# Patient Record
Sex: FEMALE | Race: WHITE | Employment: OTHER | URBAN - METROPOLITAN AREA
[De-identification: names, ages, dates, MRNs, and addresses within clinical notes are randomized per-mention and may not be internally consistent; named-entity substitution may affect disease eponyms.]

---

## 2017-01-25 ENCOUNTER — OFFICE VISIT (OUTPATIENT)
Dept: INTERNAL MEDICINE CLINIC | Age: 72
End: 2017-01-25

## 2017-01-25 VITALS
WEIGHT: 151 LBS | DIASTOLIC BLOOD PRESSURE: 52 MMHG | SYSTOLIC BLOOD PRESSURE: 120 MMHG | HEART RATE: 52 BPM | BODY MASS INDEX: 26.33 KG/M2

## 2017-01-25 DIAGNOSIS — R01.1 MURMUR: ICD-10-CM

## 2017-01-25 DIAGNOSIS — M17.12 PRIMARY OSTEOARTHRITIS OF LEFT KNEE: Primary | ICD-10-CM

## 2017-01-25 DIAGNOSIS — Z01.818 PRE-OP EVALUATION: ICD-10-CM

## 2017-01-25 DIAGNOSIS — I10 ESSENTIAL HYPERTENSION: ICD-10-CM

## 2017-01-25 DIAGNOSIS — E78.00 PURE HYPERCHOLESTEROLEMIA: ICD-10-CM

## 2017-01-25 PROCEDURE — 1090F PRES/ABSN URINE INCON ASSESS: CPT | Performed by: INTERNAL MEDICINE

## 2017-01-25 PROCEDURE — 3014F SCREEN MAMMO DOC REV: CPT | Performed by: INTERNAL MEDICINE

## 2017-01-25 PROCEDURE — 1123F ACP DISCUSS/DSCN MKR DOCD: CPT | Performed by: INTERNAL MEDICINE

## 2017-01-25 PROCEDURE — 93000 ELECTROCARDIOGRAM COMPLETE: CPT | Performed by: INTERNAL MEDICINE

## 2017-01-25 PROCEDURE — G8427 DOCREV CUR MEDS BY ELIG CLIN: HCPCS | Performed by: INTERNAL MEDICINE

## 2017-01-25 PROCEDURE — G8400 PT W/DXA NO RESULTS DOC: HCPCS | Performed by: INTERNAL MEDICINE

## 2017-01-25 PROCEDURE — 3017F COLORECTAL CA SCREEN DOC REV: CPT | Performed by: INTERNAL MEDICINE

## 2017-01-25 PROCEDURE — G8420 CALC BMI NORM PARAMETERS: HCPCS | Performed by: INTERNAL MEDICINE

## 2017-01-25 PROCEDURE — 99214 OFFICE O/P EST MOD 30 MIN: CPT | Performed by: INTERNAL MEDICINE

## 2017-01-25 PROCEDURE — G8484 FLU IMMUNIZE NO ADMIN: HCPCS | Performed by: INTERNAL MEDICINE

## 2017-01-25 PROCEDURE — 1036F TOBACCO NON-USER: CPT | Performed by: INTERNAL MEDICINE

## 2017-01-25 PROCEDURE — 4040F PNEUMOC VAC/ADMIN/RCVD: CPT | Performed by: INTERNAL MEDICINE

## 2017-01-25 ASSESSMENT — ENCOUNTER SYMPTOMS
SHORTNESS OF BREATH: 0
COUGH: 0

## 2017-02-21 ENCOUNTER — TELEPHONE (OUTPATIENT)
Dept: INTERNAL MEDICINE CLINIC | Age: 72
End: 2017-02-21

## 2017-08-09 ENCOUNTER — TELEPHONE (OUTPATIENT)
Dept: INTERNAL MEDICINE CLINIC | Age: 72
End: 2017-08-09

## 2017-08-11 ENCOUNTER — OFFICE VISIT (OUTPATIENT)
Dept: INTERNAL MEDICINE CLINIC | Age: 72
End: 2017-08-11

## 2017-08-11 VITALS
SYSTOLIC BLOOD PRESSURE: 139 MMHG | WEIGHT: 151 LBS | HEART RATE: 50 BPM | HEIGHT: 64 IN | OXYGEN SATURATION: 97 % | DIASTOLIC BLOOD PRESSURE: 73 MMHG | BODY MASS INDEX: 25.78 KG/M2

## 2017-08-11 DIAGNOSIS — I10 ESSENTIAL HYPERTENSION: Primary | ICD-10-CM

## 2017-08-11 DIAGNOSIS — E78.00 HYPERCHOLESTEROLEMIA: ICD-10-CM

## 2017-08-11 DIAGNOSIS — K21.9 GASTROESOPHAGEAL REFLUX DISEASE, ESOPHAGITIS PRESENCE NOT SPECIFIED: ICD-10-CM

## 2017-08-11 LAB
A/G RATIO: 1.9 (ref 1.1–2.2)
ALBUMIN SERPL-MCNC: 4.3 G/DL (ref 3.4–5)
ALP BLD-CCNC: 61 U/L (ref 40–129)
ALT SERPL-CCNC: 13 U/L (ref 10–40)
ANION GAP SERPL CALCULATED.3IONS-SCNC: 11 MMOL/L (ref 3–16)
AST SERPL-CCNC: 20 U/L (ref 15–37)
BILIRUB SERPL-MCNC: <0.2 MG/DL (ref 0–1)
BUN BLDV-MCNC: 13 MG/DL (ref 7–20)
CALCIUM SERPL-MCNC: 9.6 MG/DL (ref 8.3–10.6)
CHLORIDE BLD-SCNC: 100 MMOL/L (ref 99–110)
CHOLESTEROL, TOTAL: 222 MG/DL (ref 0–199)
CO2: 28 MMOL/L (ref 21–32)
CREAT SERPL-MCNC: 0.7 MG/DL (ref 0.6–1.2)
GFR AFRICAN AMERICAN: >60
GFR NON-AFRICAN AMERICAN: >60
GLOBULIN: 2.3 G/DL
GLUCOSE BLD-MCNC: 95 MG/DL (ref 70–99)
HDLC SERPL-MCNC: 85 MG/DL (ref 40–60)
LDL CHOLESTEROL CALCULATED: 112 MG/DL
POTASSIUM SERPL-SCNC: 4.9 MMOL/L (ref 3.5–5.1)
SODIUM BLD-SCNC: 139 MMOL/L (ref 136–145)
TOTAL PROTEIN: 6.6 G/DL (ref 6.4–8.2)
TRIGL SERPL-MCNC: 125 MG/DL (ref 0–150)
VLDLC SERPL CALC-MCNC: 25 MG/DL

## 2017-08-11 PROCEDURE — 90670 PCV13 VACCINE IM: CPT | Performed by: INTERNAL MEDICINE

## 2017-08-11 PROCEDURE — G8427 DOCREV CUR MEDS BY ELIG CLIN: HCPCS | Performed by: INTERNAL MEDICINE

## 2017-08-11 PROCEDURE — 3288F FALL RISK ASSESSMENT DOCD: CPT | Performed by: INTERNAL MEDICINE

## 2017-08-11 PROCEDURE — G0009 ADMIN PNEUMOCOCCAL VACCINE: HCPCS | Performed by: INTERNAL MEDICINE

## 2017-08-11 PROCEDURE — G8400 PT W/DXA NO RESULTS DOC: HCPCS | Performed by: INTERNAL MEDICINE

## 2017-08-11 PROCEDURE — 3014F SCREEN MAMMO DOC REV: CPT | Performed by: INTERNAL MEDICINE

## 2017-08-11 PROCEDURE — G8510 SCR DEP NEG, NO PLAN REQD: HCPCS | Performed by: INTERNAL MEDICINE

## 2017-08-11 PROCEDURE — 99213 OFFICE O/P EST LOW 20 MIN: CPT | Performed by: INTERNAL MEDICINE

## 2017-08-11 PROCEDURE — 1123F ACP DISCUSS/DSCN MKR DOCD: CPT | Performed by: INTERNAL MEDICINE

## 2017-08-11 PROCEDURE — 1090F PRES/ABSN URINE INCON ASSESS: CPT | Performed by: INTERNAL MEDICINE

## 2017-08-11 PROCEDURE — 4040F PNEUMOC VAC/ADMIN/RCVD: CPT | Performed by: INTERNAL MEDICINE

## 2017-08-11 PROCEDURE — 3017F COLORECTAL CA SCREEN DOC REV: CPT | Performed by: INTERNAL MEDICINE

## 2017-08-11 PROCEDURE — 1036F TOBACCO NON-USER: CPT | Performed by: INTERNAL MEDICINE

## 2017-08-11 PROCEDURE — G8419 CALC BMI OUT NRM PARAM NOF/U: HCPCS | Performed by: INTERNAL MEDICINE

## 2017-08-11 RX ORDER — RANITIDINE 150 MG/1
150 TABLET ORAL 2 TIMES DAILY
Qty: 60 TABLET | Refills: 3 | Status: SHIPPED | OUTPATIENT
Start: 2017-08-11 | End: 2018-06-04 | Stop reason: SDUPTHER

## 2017-08-11 RX ORDER — OMEPRAZOLE 20 MG/1
20 CAPSULE, DELAYED RELEASE ORAL DAILY
Qty: 30 CAPSULE | Refills: 0 | Status: SHIPPED | OUTPATIENT
Start: 2017-08-11 | End: 2019-04-02

## 2017-08-11 ASSESSMENT — PATIENT HEALTH QUESTIONNAIRE - PHQ9
SUM OF ALL RESPONSES TO PHQ QUESTIONS 1-9: 0
SUM OF ALL RESPONSES TO PHQ9 QUESTIONS 1 & 2: 0
1. LITTLE INTEREST OR PLEASURE IN DOING THINGS: 0
2. FEELING DOWN, DEPRESSED OR HOPELESS: 0

## 2017-08-13 ENCOUNTER — PATIENT MESSAGE (OUTPATIENT)
Dept: INTERNAL MEDICINE CLINIC | Age: 72
End: 2017-08-13

## 2017-09-21 RX ORDER — FLUOCINOLONE ACETONIDE 0.25 MG/G
CREAM TOPICAL
Qty: 60 G | Refills: 1 | Status: SHIPPED | OUTPATIENT
Start: 2017-09-21 | End: 2018-12-05 | Stop reason: SDUPTHER

## 2017-09-21 RX ORDER — FLUOCINOLONE ACETONIDE 0.25 MG/G
CREAM TOPICAL
Qty: 60 G | Refills: 2 | Status: CANCELLED | OUTPATIENT
Start: 2017-09-21

## 2017-09-21 RX ORDER — DIAPER,BRIEF,INFANT-TODD,DISP
EACH MISCELLANEOUS
Qty: 1 TUBE | Refills: 1 | Status: CANCELLED | OUTPATIENT
Start: 2017-09-21 | End: 2017-09-28

## 2017-09-21 NOTE — TELEPHONE ENCOUNTER
From: Marylu Trammell  To: Jonh Moore MD  Sent: 9/20/2017 10:34 AM EDT  Subject: Medication Renewal Request    Original authorizing provider: MD Marylu Carlson would like a refill of the following medications:  fluocinolone (SYNALAR) 0.025 % cream Jonh Moore MD]    Preferred pharmacy: [place] [address], [address] - [facility] 89 [phone]    Comment:  I get this one at [address] [facility] on [facility]: [phone]

## 2017-09-29 NOTE — TELEPHONE ENCOUNTER
From: Harshil De La Cruz MD  To: Venkat Colvin  Sent: 8/13/2017 4:29 PM EDT  Subject: lab results. Your labs look good. Lipid profile is so much better. You are on the lowest dose of lipitor (atorvastatin). People take between 10-80 mg. It might be worth increasing to 20 mg to get the LDL just a little lower, but if you would have any side effects with the increase, I would be comfortable with this dose. If you increase, then would recheck have you stop in the new dose. You would need to let me know when refilling so I could increase the strength. Let me know if you have any questions.   Lima Agustin

## 2017-10-02 RX ORDER — ATORVASTATIN CALCIUM 20 MG/1
20 TABLET, FILM COATED ORAL DAILY
Qty: 90 TABLET | Refills: 1 | Status: SHIPPED | OUTPATIENT
Start: 2017-10-02 | End: 2017-11-06 | Stop reason: SDUPTHER

## 2017-10-06 RX ORDER — AZITHROMYCIN 250 MG/1
TABLET, FILM COATED ORAL
Qty: 6 TABLET | Refills: 0 | Status: SHIPPED | OUTPATIENT
Start: 2017-10-06 | End: 2017-10-16

## 2017-11-06 RX ORDER — ATORVASTATIN CALCIUM 20 MG/1
20 TABLET, FILM COATED ORAL DAILY
Qty: 90 TABLET | Refills: 1 | Status: SHIPPED | OUTPATIENT
Start: 2017-11-06 | End: 2018-07-18 | Stop reason: SDUPTHER

## 2017-11-06 RX ORDER — LISINOPRIL 10 MG/1
10 TABLET ORAL DAILY
Qty: 90 TABLET | Refills: 3 | Status: SHIPPED | OUTPATIENT
Start: 2017-11-06 | End: 2019-01-03 | Stop reason: SDUPTHER

## 2017-12-18 RX ORDER — FLUOXETINE HYDROCHLORIDE 20 MG/1
CAPSULE ORAL
Qty: 90 CAPSULE | Refills: 0 | Status: SHIPPED | OUTPATIENT
Start: 2017-12-18 | End: 2018-03-19 | Stop reason: SDUPTHER

## 2018-01-23 ENCOUNTER — OFFICE VISIT (OUTPATIENT)
Dept: INTERNAL MEDICINE CLINIC | Age: 73
End: 2018-01-23

## 2018-01-23 VITALS
DIASTOLIC BLOOD PRESSURE: 90 MMHG | BODY MASS INDEX: 25.82 KG/M2 | OXYGEN SATURATION: 98 % | WEIGHT: 150.4 LBS | TEMPERATURE: 97.2 F | HEART RATE: 52 BPM | SYSTOLIC BLOOD PRESSURE: 138 MMHG

## 2018-01-23 DIAGNOSIS — M25.572 LEFT ANKLE PAIN, UNSPECIFIED CHRONICITY: ICD-10-CM

## 2018-01-23 DIAGNOSIS — J06.9 UPPER RESPIRATORY TRACT INFECTION, UNSPECIFIED TYPE: Primary | ICD-10-CM

## 2018-01-23 PROCEDURE — G8400 PT W/DXA NO RESULTS DOC: HCPCS | Performed by: INTERNAL MEDICINE

## 2018-01-23 PROCEDURE — 3014F SCREEN MAMMO DOC REV: CPT | Performed by: INTERNAL MEDICINE

## 2018-01-23 PROCEDURE — 1123F ACP DISCUSS/DSCN MKR DOCD: CPT | Performed by: INTERNAL MEDICINE

## 2018-01-23 PROCEDURE — G8427 DOCREV CUR MEDS BY ELIG CLIN: HCPCS | Performed by: INTERNAL MEDICINE

## 2018-01-23 PROCEDURE — 99213 OFFICE O/P EST LOW 20 MIN: CPT | Performed by: INTERNAL MEDICINE

## 2018-01-23 PROCEDURE — 4040F PNEUMOC VAC/ADMIN/RCVD: CPT | Performed by: INTERNAL MEDICINE

## 2018-01-23 PROCEDURE — 1090F PRES/ABSN URINE INCON ASSESS: CPT | Performed by: INTERNAL MEDICINE

## 2018-01-23 PROCEDURE — G8484 FLU IMMUNIZE NO ADMIN: HCPCS | Performed by: INTERNAL MEDICINE

## 2018-01-23 PROCEDURE — G8419 CALC BMI OUT NRM PARAM NOF/U: HCPCS | Performed by: INTERNAL MEDICINE

## 2018-01-23 PROCEDURE — 1036F TOBACCO NON-USER: CPT | Performed by: INTERNAL MEDICINE

## 2018-01-23 PROCEDURE — 3017F COLORECTAL CA SCREEN DOC REV: CPT | Performed by: INTERNAL MEDICINE

## 2018-01-23 RX ORDER — DOXYCYCLINE 100 MG/1
100 CAPSULE ORAL 2 TIMES DAILY
Qty: 14 CAPSULE | Refills: 0 | Status: SHIPPED | OUTPATIENT
Start: 2018-01-23 | End: 2018-05-25 | Stop reason: CLARIF

## 2018-01-29 ENCOUNTER — PATIENT MESSAGE (OUTPATIENT)
Dept: INTERNAL MEDICINE CLINIC | Age: 73
End: 2018-01-29

## 2018-01-29 NOTE — TELEPHONE ENCOUNTER
From: Adi Olivares  To: Deborah Metcalf MD  Sent: 1/29/2018 12:53 PM EST  Subject: Test Results Question    I had the X-rays done on Friday 1/26. When you get a chance let me know what you think.    Maggi

## 2018-02-08 NOTE — TELEPHONE ENCOUNTER
From: Lieutenant Bashir  Sent: 2/8/2018 3:35 PM EST  Subject: Medication Renewal Request    Lieutenant Bashir would like a refill of the following medications:  metroNIDAZOLE (METROCREAM) 0.75 % cream Indy Crystal MD]    Preferred pharmacy: [facility], [address] [place] [phone] - [phone]    Comment:  I use [facility] for this prescription. [phone] Thanks!

## 2018-03-19 RX ORDER — FLUOXETINE HYDROCHLORIDE 20 MG/1
CAPSULE ORAL
Qty: 90 CAPSULE | Refills: 0 | Status: SHIPPED | OUTPATIENT
Start: 2018-03-19 | End: 2018-06-29 | Stop reason: SDUPTHER

## 2018-04-02 ENCOUNTER — TELEPHONE (OUTPATIENT)
Dept: INTERNAL MEDICINE CLINIC | Age: 73
End: 2018-04-02

## 2018-05-25 ENCOUNTER — OFFICE VISIT (OUTPATIENT)
Dept: INTERNAL MEDICINE CLINIC | Age: 73
End: 2018-05-25

## 2018-05-25 VITALS — DIASTOLIC BLOOD PRESSURE: 64 MMHG | HEART RATE: 78 BPM | SYSTOLIC BLOOD PRESSURE: 138 MMHG

## 2018-05-25 DIAGNOSIS — G56.01 CARPAL TUNNEL SYNDROME OF RIGHT WRIST: Primary | ICD-10-CM

## 2018-05-25 DIAGNOSIS — E78.00 PURE HYPERCHOLESTEROLEMIA: ICD-10-CM

## 2018-05-25 PROCEDURE — 4040F PNEUMOC VAC/ADMIN/RCVD: CPT | Performed by: INTERNAL MEDICINE

## 2018-05-25 PROCEDURE — G8419 CALC BMI OUT NRM PARAM NOF/U: HCPCS | Performed by: INTERNAL MEDICINE

## 2018-05-25 PROCEDURE — G8400 PT W/DXA NO RESULTS DOC: HCPCS | Performed by: INTERNAL MEDICINE

## 2018-05-25 PROCEDURE — 3017F COLORECTAL CA SCREEN DOC REV: CPT | Performed by: INTERNAL MEDICINE

## 2018-05-25 PROCEDURE — 1090F PRES/ABSN URINE INCON ASSESS: CPT | Performed by: INTERNAL MEDICINE

## 2018-05-25 PROCEDURE — G8427 DOCREV CUR MEDS BY ELIG CLIN: HCPCS | Performed by: INTERNAL MEDICINE

## 2018-05-25 PROCEDURE — 3014F SCREEN MAMMO DOC REV: CPT | Performed by: INTERNAL MEDICINE

## 2018-05-25 PROCEDURE — 1036F TOBACCO NON-USER: CPT | Performed by: INTERNAL MEDICINE

## 2018-05-25 PROCEDURE — 1123F ACP DISCUSS/DSCN MKR DOCD: CPT | Performed by: INTERNAL MEDICINE

## 2018-05-25 PROCEDURE — 99213 OFFICE O/P EST LOW 20 MIN: CPT | Performed by: INTERNAL MEDICINE

## 2018-06-04 RX ORDER — RANITIDINE 150 MG/1
150 TABLET ORAL 2 TIMES DAILY
Qty: 60 TABLET | Refills: 11 | Status: SHIPPED | OUTPATIENT
Start: 2018-06-04 | End: 2019-06-21 | Stop reason: SDUPTHER

## 2018-06-29 RX ORDER — FLUOXETINE HYDROCHLORIDE 20 MG/1
CAPSULE ORAL
Qty: 90 CAPSULE | Refills: 3 | Status: SHIPPED | OUTPATIENT
Start: 2018-06-29 | End: 2019-04-02 | Stop reason: SDUPTHER

## 2018-07-18 RX ORDER — ATORVASTATIN CALCIUM 20 MG/1
20 TABLET, FILM COATED ORAL DAILY
Qty: 90 TABLET | Refills: 1 | Status: SHIPPED | OUTPATIENT
Start: 2018-07-18 | End: 2019-02-26 | Stop reason: SDUPTHER

## 2018-07-18 NOTE — TELEPHONE ENCOUNTER
From: Marj Avila  Sent: 7/18/2018 10:32 AM EDT  Subject: Medication Renewal Request    Marj Avila would like a refill of the following medications:     atorvastatin (LIPITOR) 20 MG tablet Samantha Miranda MD]    Preferred pharmacy: [place] [address]

## 2018-09-26 ENCOUNTER — OFFICE VISIT (OUTPATIENT)
Dept: INTERNAL MEDICINE CLINIC | Age: 73
End: 2018-09-26
Payer: MEDICARE

## 2018-09-26 VITALS
DIASTOLIC BLOOD PRESSURE: 64 MMHG | SYSTOLIC BLOOD PRESSURE: 118 MMHG | WEIGHT: 151 LBS | HEART RATE: 58 BPM | BODY MASS INDEX: 25.92 KG/M2 | OXYGEN SATURATION: 96 %

## 2018-09-26 DIAGNOSIS — J39.2 THROAT IRRITATION: Primary | ICD-10-CM

## 2018-09-26 DIAGNOSIS — Z23 NEED FOR PROPHYLACTIC VACCINATION AND INOCULATION AGAINST VARICELLA: ICD-10-CM

## 2018-09-26 PROCEDURE — 1101F PT FALLS ASSESS-DOCD LE1/YR: CPT | Performed by: INTERNAL MEDICINE

## 2018-09-26 PROCEDURE — G8400 PT W/DXA NO RESULTS DOC: HCPCS | Performed by: INTERNAL MEDICINE

## 2018-09-26 PROCEDURE — 99213 OFFICE O/P EST LOW 20 MIN: CPT | Performed by: INTERNAL MEDICINE

## 2018-09-26 PROCEDURE — G8427 DOCREV CUR MEDS BY ELIG CLIN: HCPCS | Performed by: INTERNAL MEDICINE

## 2018-09-26 PROCEDURE — 3014F SCREEN MAMMO DOC REV: CPT | Performed by: INTERNAL MEDICINE

## 2018-09-26 PROCEDURE — 3017F COLORECTAL CA SCREEN DOC REV: CPT | Performed by: INTERNAL MEDICINE

## 2018-09-26 PROCEDURE — 1036F TOBACCO NON-USER: CPT | Performed by: INTERNAL MEDICINE

## 2018-09-26 PROCEDURE — 4040F PNEUMOC VAC/ADMIN/RCVD: CPT | Performed by: INTERNAL MEDICINE

## 2018-09-26 PROCEDURE — G8419 CALC BMI OUT NRM PARAM NOF/U: HCPCS | Performed by: INTERNAL MEDICINE

## 2018-09-26 PROCEDURE — G0008 ADMIN INFLUENZA VIRUS VAC: HCPCS | Performed by: INTERNAL MEDICINE

## 2018-09-26 PROCEDURE — 90662 IIV NO PRSV INCREASED AG IM: CPT | Performed by: INTERNAL MEDICINE

## 2018-09-26 PROCEDURE — 1123F ACP DISCUSS/DSCN MKR DOCD: CPT | Performed by: INTERNAL MEDICINE

## 2018-09-26 PROCEDURE — 1090F PRES/ABSN URINE INCON ASSESS: CPT | Performed by: INTERNAL MEDICINE

## 2018-09-26 RX ORDER — FLUTICASONE PROPIONATE 50 MCG
2 SPRAY, SUSPENSION (ML) NASAL DAILY
Qty: 1 BOTTLE | Refills: 2 | Status: SHIPPED | OUTPATIENT
Start: 2018-09-26 | End: 2020-04-28 | Stop reason: SDUPTHER

## 2018-09-26 NOTE — PROGRESS NOTES
[facility]  Internal Medicine Progress Note  Jeyson Harris MD    DOS: 2018    Linh Meneses  :1945    ASSESSMENT/PLAN:   Throat irritation  Persistent fixed abnormal sensation. Secondary to post-nasal drainage? Diff also includes Zenkers, esophagitis/ulcer or other  Start flonase  Schedule with ENT (referred to Dr. Delphine Dalton) if not improving with flonase for at least a week. Need for prophylactic vaccination and inoculation against varicella  -     zoster recombinant adjuvanted vaccine (SHINGRIX) 50 MCG SUSR injection; 50 MCG IM then repeat 2-6 months. Discussed medications with patient who voiced understanding of their use and indications. All questions answered. Medical Assistant Triage:  Chief Complaint   Patient presents with    Other     feels she has troubled swallowing. States no pain but feels like someting is catching in her throat. HPI:   This is a 72 y.o. female. She is here today for swallowing abnormality. Several months with sensation of something in upper throat. Stays fixed. Doesn't bother with eating. No dysphagia. Causes her to cough at times, but non-productive. . Denies allergies but endorses post-nasal drainage. Denies heartburn. Still taking omeprazole. ROS As per HPI. Patient Active Problem List   Diagnosis    Essential hypertension    History of depression    Pure hypercholesterolemia    Osteoarthritis of left knee    Murmur    GERD (gastroesophageal reflux disease)     Prior to Visit Medications    Medication Sig Taking? Authorizing Provider   zoster recombinant adjuvanted vaccine (SHINGRIX) 50 MCG SUSR injection 50 MCG IM then repeat 2-6 months.  Yes Jeyson Harris MD   fluticasone (FLONASE) 50 MCG/ACT nasal spray 2 sprays by Nasal route daily Yes Jeyson Harris MD   atorvastatin (LIPITOR) 20 MG tablet Take 1 tablet by mouth daily Yes Jeyson Harris MD   FLUoxetine (PROZAC) 20 MG capsule TAKE ONE CAPSULE BY MOUTH DAILY Yes Brock Cristela Hodge MD   ranitidine (ZANTAC) 150 MG tablet Take 1 tablet by mouth 2 times daily Yes Corey Garza MD   metroNIDAZOLE (METROCREAM) 0.75 % cream Apply topically 2 times daily. Yes Corey Garza MD   lisinopril (PRINIVIL;ZESTRIL) 10 MG tablet Take 1 tablet by mouth daily Yes Corey Garza MD   fluocinolone (SYNALAR) 0.025 % cream Apply topically 2 times daily. Yes Corey Garza MD   omeprazole (PRILOSEC) 20 MG delayed release capsule Take 1 capsule by mouth daily Yes Corey Garza MD   clobetasol (TEMOVATE) 0.05 % external solution Apply topically 2 times daily. Yes Corey Garza MD   fexofenadine [facility] ALLERGY) 180 MG tablet Take 1 tablet by mouth daily Yes VIJAYA Lee - CNP   Multiple Vitamin (MULTIVITAMIN PO) Take  by mouth. Yes Historical Provider, MD   Calcium Carbonate-Vitamin D (CALCIUM + D PO) Take  by mouth. Yes Historical Provider, MD     Allergies   Allergen Reactions    Amoxicillin Hives     BP Readings from Last 3 Encounters:   09/26/18 118/64   05/25/18 138/64   01/23/18 (!) 138/90      Wt Readings from Last 3 Encounters:   09/26/18 151 lb (68.5 kg)   01/23/18 150 lb 6.4 oz (68.2 kg)   10/25/17 150 lb (68 kg)     OBJECTIVE:  Vitals:    09/26/18 1140   BP: 118/64   Pulse: 58   SpO2: 96%   Weight: 151 lb (68.5 kg)       Physical Exam   Constitutional: No distress. HENT:   Minimal cobblestoning. Turbinates enlarged   Neck: No tracheal deviation present. No thyromegaly present. Cardiovascular: Normal rate and regular rhythm. Pulmonary/Chest: Effort normal and breath sounds normal.   Musculoskeletal: She exhibits no edema. Lymphadenopathy:     She has no cervical adenopathy.

## 2018-12-05 RX ORDER — FLUOCINOLONE ACETONIDE 0.25 MG/G
CREAM TOPICAL
Qty: 60 G | Refills: 1 | Status: SHIPPED | OUTPATIENT
Start: 2018-12-05 | End: 2020-07-27 | Stop reason: SDUPTHER

## 2018-12-05 NOTE — TELEPHONE ENCOUNTER
From: Myla Cheadle  Sent: 12/4/2018 8:48 PM EST  Subject: Medication Renewal Request    Myla Cheadle would like a refill of the following medications:     fluocinolone (SYNALAR) 0.025 % cream Jaxon Patton MD]   Patient Comment: Could I just get 30 grams?  It last longer than the expiration date    Preferred pharmacy: [address] [facility]

## 2019-02-27 RX ORDER — ATORVASTATIN CALCIUM 20 MG/1
TABLET, FILM COATED ORAL
Qty: 90 TABLET | Refills: 0 | Status: SHIPPED | OUTPATIENT
Start: 2019-02-27 | End: 2019-04-02 | Stop reason: SDUPTHER

## 2019-04-01 PROBLEM — G56.01 CARPAL TUNNEL SYNDROME OF RIGHT WRIST: Status: ACTIVE | Noted: 2019-04-01

## 2019-04-02 ENCOUNTER — OFFICE VISIT (OUTPATIENT)
Dept: INTERNAL MEDICINE CLINIC | Age: 74
End: 2019-04-02
Payer: MEDICARE

## 2019-04-02 VITALS
OXYGEN SATURATION: 98 % | BODY MASS INDEX: 26.26 KG/M2 | DIASTOLIC BLOOD PRESSURE: 72 MMHG | HEART RATE: 52 BPM | WEIGHT: 153 LBS | SYSTOLIC BLOOD PRESSURE: 118 MMHG

## 2019-04-02 DIAGNOSIS — I10 ESSENTIAL HYPERTENSION: Primary | ICD-10-CM

## 2019-04-02 DIAGNOSIS — G56.01 CARPAL TUNNEL SYNDROME OF RIGHT WRIST: ICD-10-CM

## 2019-04-02 DIAGNOSIS — E78.00 PURE HYPERCHOLESTEROLEMIA: ICD-10-CM

## 2019-04-02 DIAGNOSIS — Z78.0 POSTMENOPAUSAL: ICD-10-CM

## 2019-04-02 PROCEDURE — 3017F COLORECTAL CA SCREEN DOC REV: CPT | Performed by: INTERNAL MEDICINE

## 2019-04-02 PROCEDURE — G8419 CALC BMI OUT NRM PARAM NOF/U: HCPCS | Performed by: INTERNAL MEDICINE

## 2019-04-02 PROCEDURE — 99213 OFFICE O/P EST LOW 20 MIN: CPT | Performed by: INTERNAL MEDICINE

## 2019-04-02 PROCEDURE — 1123F ACP DISCUSS/DSCN MKR DOCD: CPT | Performed by: INTERNAL MEDICINE

## 2019-04-02 PROCEDURE — G8400 PT W/DXA NO RESULTS DOC: HCPCS | Performed by: INTERNAL MEDICINE

## 2019-04-02 PROCEDURE — 1090F PRES/ABSN URINE INCON ASSESS: CPT | Performed by: INTERNAL MEDICINE

## 2019-04-02 PROCEDURE — 3014F SCREEN MAMMO DOC REV: CPT | Performed by: INTERNAL MEDICINE

## 2019-04-02 PROCEDURE — 4040F PNEUMOC VAC/ADMIN/RCVD: CPT | Performed by: INTERNAL MEDICINE

## 2019-04-02 PROCEDURE — 1036F TOBACCO NON-USER: CPT | Performed by: INTERNAL MEDICINE

## 2019-04-02 PROCEDURE — G8427 DOCREV CUR MEDS BY ELIG CLIN: HCPCS | Performed by: INTERNAL MEDICINE

## 2019-04-02 RX ORDER — FLUOXETINE HYDROCHLORIDE 20 MG/1
CAPSULE ORAL
Qty: 90 CAPSULE | Refills: 3 | Status: SHIPPED | OUTPATIENT
Start: 2019-04-02 | End: 2020-04-26

## 2019-04-02 RX ORDER — LISINOPRIL 10 MG/1
TABLET ORAL
Qty: 90 TABLET | Refills: 3 | Status: SHIPPED | OUTPATIENT
Start: 2019-04-02 | End: 2020-04-26

## 2019-04-02 RX ORDER — ATORVASTATIN CALCIUM 20 MG/1
TABLET, FILM COATED ORAL
Qty: 90 TABLET | Refills: 3 | Status: SHIPPED | OUTPATIENT
Start: 2019-04-02 | End: 2020-04-28 | Stop reason: SDUPTHER

## 2019-04-02 ASSESSMENT — PATIENT HEALTH QUESTIONNAIRE - PHQ9
SUM OF ALL RESPONSES TO PHQ9 QUESTIONS 1 & 2: 0
2. FEELING DOWN, DEPRESSED OR HOPELESS: 0
1. LITTLE INTEREST OR PLEASURE IN DOING THINGS: 0
SUM OF ALL RESPONSES TO PHQ QUESTIONS 1-9: 0
SUM OF ALL RESPONSES TO PHQ QUESTIONS 1-9: 0

## 2019-04-02 ASSESSMENT — ENCOUNTER SYMPTOMS
SHORTNESS OF BREATH: 0
CHEST TIGHTNESS: 0

## 2019-04-02 NOTE — PROGRESS NOTES
[facility]  Internal Medicine Progress Note  Raven Mckeon MD  2019    [employer]  :1945    BP Readings from Last 3 Encounters:   19 118/72   18 118/64   18 138/64      Wt Readings from Last 3 Encounters:   19 153 lb (69.4 kg)   18 151 lb (68.5 kg)   18 150 lb 6.4 oz (68.2 kg)       ASSESSMENT/PLAN:   Essential hypertension  At goal.  Continue current meds. Check renal function and electrolytes. Pure hypercholesterolemia  Last LDL at goal.  Recheck labs today. Continue current meds. Carpal tunnel syndrome of right wrist  Minimal symptoms using splint. Postmenopausal  -     DEXA Bone Density Axial Skeleton; Future    Discussed medications with patient who voiced understanding of their use and indications. All questions answered. Return in about 1 year (around 2020). Medical assistant triage:   Chief Complaint   Patient presents with    Follow-up       HPI:   72 y.o. female here today for evaluation of the following medical conditions/concerns:    HTN- no problems with medications. No chest pain or dyspnea. Less active. No longer at [facility], but will get outside more now for activity with nicer weather. Lipids- fine with meds. No side effects. No cv symptoms     Carpal tunnel symptoms controlled. Using OTC splint now. Review of Systems   Respiratory: Negative for chest tightness and shortness of breath. Cardiovascular: Negative for chest pain and leg swelling. As per HPI. Prior to Visit Medications    Medication Sig Taking? Authorizing Provider   lisinopril (PRINIVIL;ZESTRIL) 10 MG tablet TAKE ONE TABLET BY MOUTH DAILY Yes Raven Mckeon MD   atorvastatin (LIPITOR) 20 MG tablet TAKE ONE TABLET BY MOUTH DAILY Yes Raven Mckeon MD   FLUoxetine (PROZAC) 20 MG capsule TAKE ONE CAPSULE BY MOUTH DAILY Yes Raven Mckeon MD   metroNIDAZOLE (METROCREAM) 0.75 % cream Apply topically 2 times daily.  Yes Raven Mckeon MD   fluocinolone (SYNALAR) 0.025 % cream Apply topically 2 times daily. Yes Jose Mendes MD   fluticasone (FLONASE) 50 MCG/ACT nasal spray 2 sprays by Nasal route daily Yes Jose Mendes MD   ranitidine (ZANTAC) 150 MG tablet Take 1 tablet by mouth 2 times daily Yes Jose Mendes MD   clobetasol (TEMOVATE) 0.05 % external solution Apply topically 2 times daily. Yes Jose Mendes MD   Multiple Vitamin (MULTIVITAMIN PO) Take  by mouth. Yes Historical Provider, MD   Calcium Carbonate-Vitamin D (CALCIUM + D PO) Take  by mouth. Yes Historical Provider, MD     Social History     Tobacco Use   Smoking Status Never Smoker   Smokeless Tobacco Never Used       OBJECTIVE:    Vitals:    04/02/19 1104   BP: 118/72   Pulse: 52   SpO2: 98%   Weight: 153 lb (69.4 kg)     Body mass index is 26.26 kg/m². Physical Exam   Constitutional: No distress. Neck: Carotid bruit is not present. No thyroid mass and no thyromegaly present. Cardiovascular: Normal rate and regular rhythm. Murmur (1-2/6 murmur at RSB) heard. Pulmonary/Chest: Effort normal and breath sounds normal.   Musculoskeletal: She exhibits no edema.

## 2019-06-24 RX ORDER — RANITIDINE 150 MG/1
TABLET ORAL
Qty: 60 TABLET | Refills: 5 | Status: SHIPPED | OUTPATIENT
Start: 2019-06-24 | End: 2020-04-28

## 2019-11-30 RX ORDER — FAMOTIDINE 40 MG/1
TABLET, FILM COATED ORAL
Qty: 60 TABLET | Refills: 0 | Status: SHIPPED | OUTPATIENT
Start: 2019-11-30 | End: 2019-12-30

## 2019-12-30 RX ORDER — FAMOTIDINE 40 MG/1
TABLET, FILM COATED ORAL
Qty: 60 TABLET | Refills: 5 | Status: SHIPPED | OUTPATIENT
Start: 2019-12-30 | End: 2020-09-17

## 2020-04-26 RX ORDER — FLUOXETINE HYDROCHLORIDE 20 MG/1
CAPSULE ORAL
Qty: 90 CAPSULE | Refills: 0 | Status: SHIPPED | OUTPATIENT
Start: 2020-04-26 | End: 2020-04-28 | Stop reason: SDUPTHER

## 2020-04-26 RX ORDER — LISINOPRIL 10 MG/1
TABLET ORAL
Qty: 90 TABLET | Refills: 0 | Status: SHIPPED | OUTPATIENT
Start: 2020-04-26 | End: 2020-04-28 | Stop reason: SDUPTHER

## 2020-04-27 ASSESSMENT — PATIENT HEALTH QUESTIONNAIRE - PHQ9
SUM OF ALL RESPONSES TO PHQ QUESTIONS 1-9: 0
SUM OF ALL RESPONSES TO PHQ QUESTIONS 1-9: 0
SUM OF ALL RESPONSES TO PHQ9 QUESTIONS 1 & 2: 0
1. LITTLE INTEREST OR PLEASURE IN DOING THINGS: 0
2. FEELING DOWN, DEPRESSED OR HOPELESS: 0

## 2020-04-28 ENCOUNTER — VIRTUAL VISIT (OUTPATIENT)
Dept: INTERNAL MEDICINE CLINIC | Age: 75
End: 2020-04-28
Payer: MEDICARE

## 2020-04-28 VITALS — HEART RATE: 54 BPM | BODY MASS INDEX: 25.58 KG/M2 | WEIGHT: 149 LBS

## 2020-04-28 PROCEDURE — 99214 OFFICE O/P EST MOD 30 MIN: CPT | Performed by: INTERNAL MEDICINE

## 2020-04-28 PROCEDURE — 3017F COLORECTAL CA SCREEN DOC REV: CPT | Performed by: INTERNAL MEDICINE

## 2020-04-28 PROCEDURE — G8400 PT W/DXA NO RESULTS DOC: HCPCS | Performed by: INTERNAL MEDICINE

## 2020-04-28 PROCEDURE — G8427 DOCREV CUR MEDS BY ELIG CLIN: HCPCS | Performed by: INTERNAL MEDICINE

## 2020-04-28 PROCEDURE — 1090F PRES/ABSN URINE INCON ASSESS: CPT | Performed by: INTERNAL MEDICINE

## 2020-04-28 PROCEDURE — 1123F ACP DISCUSS/DSCN MKR DOCD: CPT | Performed by: INTERNAL MEDICINE

## 2020-04-28 PROCEDURE — 4040F PNEUMOC VAC/ADMIN/RCVD: CPT | Performed by: INTERNAL MEDICINE

## 2020-04-28 RX ORDER — FLUOXETINE HYDROCHLORIDE 20 MG/1
CAPSULE ORAL
Qty: 90 CAPSULE | Refills: 1 | Status: SHIPPED | OUTPATIENT
Start: 2020-04-28 | End: 2021-01-20

## 2020-04-28 RX ORDER — LISINOPRIL 10 MG/1
TABLET ORAL
Qty: 90 TABLET | Refills: 1 | Status: SHIPPED | OUTPATIENT
Start: 2020-04-28 | End: 2021-01-20

## 2020-04-28 RX ORDER — FLUTICASONE PROPIONATE 50 MCG
2 SPRAY, SUSPENSION (ML) NASAL DAILY
Qty: 1 BOTTLE | Refills: 2 | Status: SHIPPED | OUTPATIENT
Start: 2020-04-28

## 2020-04-28 RX ORDER — ATORVASTATIN CALCIUM 20 MG/1
TABLET, FILM COATED ORAL
Qty: 90 TABLET | Refills: 1 | Status: SHIPPED | OUTPATIENT
Start: 2020-04-28 | End: 2020-12-28

## 2020-04-29 ASSESSMENT — ENCOUNTER SYMPTOMS
CHEST TIGHTNESS: 0
SHORTNESS OF BREATH: 0

## 2020-07-27 RX ORDER — FLUOCINOLONE ACETONIDE 0.25 MG/G
CREAM TOPICAL
Qty: 60 G | Refills: 2 | Status: SHIPPED | OUTPATIENT
Start: 2020-07-27 | End: 2020-08-03 | Stop reason: SDUPTHER

## 2020-08-03 ENCOUNTER — PATIENT MESSAGE (OUTPATIENT)
Dept: INTERNAL MEDICINE CLINIC | Age: 75
End: 2020-08-03

## 2020-08-03 RX ORDER — FLUOCINOLONE ACETONIDE 0.25 MG/G
CREAM TOPICAL
Qty: 60 G | Refills: 2 | Status: SHIPPED | OUTPATIENT
Start: 2020-08-03 | End: 2021-11-01 | Stop reason: SDUPTHER

## 2020-08-03 NOTE — TELEPHONE ENCOUNTER
From: Lonita Kussmaul  To: Edwin Shah MD  Sent: 8/3/2020 2:42 PM EDT  Subject: Prescription Question    Would you call in the prescriptions for the 2 creams that you [facility] into [facility] to ALBINA HAMMOND instead? I tried to get their price but they wouldn't give it unless I had a prescription. The [facility] price was very high so I'm shopping it.    thanks,   Dinorah Garza

## 2020-09-17 RX ORDER — FAMOTIDINE 40 MG/1
TABLET, FILM COATED ORAL
Qty: 60 TABLET | Refills: 3 | Status: SHIPPED | OUTPATIENT
Start: 2020-09-17 | End: 2021-01-24

## 2020-09-17 NOTE — TELEPHONE ENCOUNTER
Medication:   Requested Prescriptions     Pending Prescriptions Disp Refills    famotidine (PEPCID) 40 MG tablet [Pharmacy Med Name: FAMOTIDINE 40 MG TABLET] 60 tablet 0     Sig: TAKE ONE TABLET BY MOUTH TWICE A DAY     Last Filled: 12/30/19    Last appt: 4/28/2020   Next appt: 10/30/2020

## 2020-11-11 ENCOUNTER — VIRTUAL VISIT (OUTPATIENT)
Dept: INTERNAL MEDICINE CLINIC | Age: 75
End: 2020-11-11
Payer: MEDICARE

## 2020-11-11 PROCEDURE — 1123F ACP DISCUSS/DSCN MKR DOCD: CPT | Performed by: INTERNAL MEDICINE

## 2020-11-11 PROCEDURE — 4040F PNEUMOC VAC/ADMIN/RCVD: CPT | Performed by: INTERNAL MEDICINE

## 2020-11-11 PROCEDURE — 3017F COLORECTAL CA SCREEN DOC REV: CPT | Performed by: INTERNAL MEDICINE

## 2020-11-11 PROCEDURE — G8484 FLU IMMUNIZE NO ADMIN: HCPCS | Performed by: INTERNAL MEDICINE

## 2020-11-11 PROCEDURE — G0438 PPPS, INITIAL VISIT: HCPCS | Performed by: INTERNAL MEDICINE

## 2020-11-11 RX ORDER — CLOBETASOL PROPIONATE 0.46 MG/ML
SOLUTION TOPICAL
Qty: 1 BOTTLE | Refills: 1 | Status: SHIPPED | OUTPATIENT
Start: 2020-11-11

## 2020-11-11 ASSESSMENT — LIFESTYLE VARIABLES
HOW OFTEN DURING THE LAST YEAR HAVE YOU BEEN UNABLE TO REMEMBER WHAT HAPPENED THE NIGHT BEFORE BECAUSE YOU HAD BEEN DRINKING: 0
HOW MANY STANDARD DRINKS CONTAINING ALCOHOL DO YOU HAVE ON A TYPICAL DAY: 0
HAVE YOU OR SOMEONE ELSE BEEN INJURED AS A RESULT OF YOUR DRINKING: 0
HOW OFTEN DURING THE LAST YEAR HAVE YOU HAD A FEELING OF GUILT OR REMORSE AFTER DRINKING: 0
HOW OFTEN DO YOU HAVE A DRINK CONTAINING ALCOHOL: 4
HOW OFTEN DURING THE LAST YEAR HAVE YOU NEEDED AN ALCOHOLIC DRINK FIRST THING IN THE MORNING TO GET YOURSELF GOING AFTER A NIGHT OF HEAVY DRINKING: 0
HOW OFTEN DURING THE LAST YEAR HAVE YOU FOUND THAT YOU WERE NOT ABLE TO STOP DRINKING ONCE YOU HAD STARTED: 0
HOW OFTEN DURING THE LAST YEAR HAVE YOU FAILED TO DO WHAT WAS NORMALLY EXPECTED FROM YOU BECAUSE OF DRINKING: 0
HAS A RELATIVE, FRIEND, DOCTOR, OR ANOTHER HEALTH PROFESSIONAL EXPRESSED CONCERN ABOUT YOUR DRINKING OR SUGGESTED YOU CUT DOWN: 0
AUDIT-C TOTAL SCORE: 4
AUDIT TOTAL SCORE: 4
HOW OFTEN DO YOU HAVE SIX OR MORE DRINKS ON ONE OCCASION: 0

## 2020-11-11 ASSESSMENT — PATIENT HEALTH QUESTIONNAIRE - PHQ9
SUM OF ALL RESPONSES TO PHQ9 QUESTIONS 1 & 2: 0
SUM OF ALL RESPONSES TO PHQ QUESTIONS 1-9: 0
1. LITTLE INTEREST OR PLEASURE IN DOING THINGS: 0
SUM OF ALL RESPONSES TO PHQ QUESTIONS 1-9: 0
2. FEELING DOWN, DEPRESSED OR HOPELESS: 0
SUM OF ALL RESPONSES TO PHQ QUESTIONS 1-9: 0

## 2020-11-11 NOTE — PATIENT INSTRUCTIONS
Personalized Preventive Plan for [facility] - 11/11/2020  Medicare offers a range of preventive health benefits. Some of the tests and screenings are paid in full while other may be subject to a deductible, co-insurance, and/or copay. Some of these benefits include a comprehensive review of your medical history including lifestyle, illnesses that may run in your family, and various assessments and screenings as appropriate. After reviewing your medical record and screening and assessments performed today your provider may have ordered immunizations, labs, imaging, and/or referrals for you. A list of these orders (if applicable) as well as your Preventive Care list are included within your After Visit Summary for your review. Other Preventive Recommendations:    · A preventive eye exam performed by an eye specialist is recommended every 1-2 years to screen for glaucoma; cataracts, macular degeneration, and other eye disorders. · A preventive dental visit is recommended every 6 months. · Try to get at least 150 minutes of exercise per week or 10,000 steps per day on a pedometer . · Order or download the FREE \"Exercise & Physical Activity: Your Everyday Guide\" from [employer]. Call 1-453.324.2123 or search [employer] online. · You need 8328-4311 mg of calcium and 5991-9369 IU of vitamin D per day. It is possible to meet your calcium requirement with diet alone, but a vitamin D supplement is usually necessary to meet this goal.  · When exposed to the sun, use a sunscreen that protects against both UVA and UVB radiation with an SPF of 30 or greater. Reapply every 2 to 3 hours or after sweating, drying off with a towel, or swimming. · Always wear a seat belt when traveling in a car. Always wear a helmet when riding a bicycle or motorcycle.

## 2020-11-11 NOTE — PROGRESS NOTES
Medicare Annual Wellness Visit  Name: Hero Shrestha Date: 2020   MRN: <[id number]> Sex: Female   Age: 70 y.o. Ethnicity: Non-/Non    : 1945 Race: Corie Singer is here for Medicare AWV    Screenings for behavioral, psychosocial and functional/safety risks, and cognitive dysfunction are all negative except as indicated below. These results, as well as other patient data from the [address] form, are documented in Flowsheets linked to this Encounter. Allergies   Allergen Reactions    Amoxicillin Hives         Prior to Visit Medications    Medication Sig Taking? Authorizing Provider   clobetasol (TEMOVATE) 0.05 % external solution Apply to scalp daily Yes Romy Meadows MD   famotidine (PEPCID) 40 MG tablet TAKE ONE TABLET BY MOUTH TWICE A DAY Yes Romy Meadows MD   fluocinolone (SYNALAR) 0.025 % cream Apply topically 2 times daily. Yes Romy Meadows MD   metroNIDAZOLE (METROCREAM) 0.75 % cream APPLY  CREAM TOPICALLY TWICE DAILY Yes Romy Meadows MD   FLUoxetine (PROZAC) 20 MG capsule TAKE ONE CAPSULE BY MOUTH DAILY Yes Romy Meadows MD   atorvastatin (LIPITOR) 20 MG tablet TAKE ONE TABLET BY MOUTH DAILY Yes Romy Meadows MD   lisinopril (PRINIVIL;ZESTRIL) 10 MG tablet TAKE ONE TABLET BY MOUTH DAILY Yes Romy Meadows MD   TURMERIC PO Take by mouth daily  Historical Provider, MD   Potassium 99 MG TABS Take 99 mg by mouth daily  Historical Provider, MD   fluticasone (FLONASE) 50 MCG/ACT nasal spray 2 sprays by Nasal route daily  Romy Meadows MD   Multiple Vitamin (MULTIVITAMIN PO) Take  by mouth. Historical Provider, MD   Calcium Carbonate-Vitamin D (CALCIUM + D PO) Take  by mouth.     Historical Provider, MD         Past Medical History:   Diagnosis Date    Depression     Hypertension     Post-menopausal     Status post left knee replacement        Past Surgical History:   Procedure Laterality Date    BLEPHAROPLASTY      BREAST REDUCTION SURGERY 12/11    MANDIBLE RECONSTRUCTION  90's    TONSILLECTOMY      TOTAL KNEE ARTHROPLASTY Left 01/2017    Dr. Devante Oliva         Family History   Problem Relation Age of Onset    Cancer Mother         lung, smoker    Cancer Father         esophageal, smoker       CareTeam (Including outside providers/suppliers regularly involved in providing care):   Patient Care Team:  Wan Charlton MD as PCP - General (Internal Medicine)  Wan Charlton MD as PCP - [facility] Empaneled Provider  Kymberly Lowry MD (Obstetrics & Gynecology)  Stiven Reddy MD as Surgeon (Orthopedic Surgery)    Wt Readings from Last 3 Encounters:   04/28/20 149 lb (67.6 kg)   04/02/19 153 lb (69.4 kg)   09/26/18 151 lb (68.5 kg)     Patient-Reported Vitals 11/11/2020   Patient-Reported Systolic 807   Patient-Reported Diastolic 95      There is no height or weight on file to calculate BMI. Based upon direct observation of the patient, evaluation of cognition reveals recent and remote memory intact. Patient's complete Health Risk Assessment and screening values have been reviewed and are found in Flowsheets. The following problems were reviewed today and where indicated follow up appointments were made and/or referrals ordered. Positive Risk Factor Screenings with Interventions:       General Health and ACP:  General  In general, how would you say your health is?: Excellent  In the past 7 days, have you experienced any of the following?  New or Increased Pain, New or Increased Fatigue, Loneliness, Social Isolation, Stress or Anger?: None of These  Do you get the social and emotional support that you need?: Yes  Do you have a Living Will?: Yes  Advance Directives     Power of [address] Will    Not on File Not on File      General Health Risk Interventions:  · No Living Will: ACP documents already completed- patient asked to provide copy to the office    Personalized Preventive Plan   Current Health Maintenance Status  Immunization History Administered Date(s) Administered    Hepatitis A 05/08/2006    Influenza 11/15/2012    Influenza Virus Vaccine 11/01/2020    Influenza, High Dose (Fluzone 65 yrs and older) 10/04/2013, 10/10/2014, 11/06/2015, 09/26/2018    Influenza, Jerone Cushing, IM, (6 mo and older Fluzone, Flulaval, Fluarix and 3 yrs and older Afluria) 11/02/2016, 12/01/2017    Pneumococcal Conjugate 13-valent (Xenipfh60) 08/11/2017    Pneumococcal Polysaccharide (Akcdehivr30) 10/10/2014    Tdap (Boostrix, Adacel) 07/17/2010    Zoster Recombinant (Shingrix) 03/05/2019, 06/11/2019        Health Maintenance   Topic Date Due    Annual Wellness Visit (AWV)  05/29/2019    Breast cancer screen  12/15/2019    Lipid screen  04/02/2020    Potassium monitoring  04/02/2020    Creatinine monitoring  04/02/2020    Colon cancer screen colonoscopy  11/02/2021    DTaP/Tdap/Td vaccine (3 - Td) 09/19/2023    DEXA (modify frequency per FRAX score)  Completed    Flu vaccine  Completed    Shingles Vaccine  Completed    Pneumococcal 65+ years Vaccine  Completed    Hepatitis C screen  Completed    Hepatitis A vaccine  Aged Out    Hepatitis B vaccine  Aged Out    Hib vaccine  Aged Out    Meningococcal (ACWY) vaccine  Aged [employer] annual wellness visit, initial  Recommendations for [employer] Due: see orders and patient instructions/AVS.  . Recommended screening schedule for the next 5-10 years is provided to the patient in written form: see Patient Instructions/AVS.     Patient will provide copy of living will, medical POA    Pure hypercholesterolemia  Due for labs. Orders in labs system, will have done in next 1-2 months, but ok to hold off now due to current COVID surge  -     Lipid Panel; Future    Essential hypertension  Not controlled. Was rushed ihen checked. Patient will recheck and provide office with new reading. Encounter for screening mammogram for breast cancer  -     [facility] Digital Screen Bilateral [[id number]];

## 2020-11-16 VITALS — SYSTOLIC BLOOD PRESSURE: 133 MMHG | DIASTOLIC BLOOD PRESSURE: 77 MMHG

## 2020-12-28 RX ORDER — ATORVASTATIN CALCIUM 20 MG/1
TABLET, FILM COATED ORAL
Qty: 90 TABLET | Refills: 2 | Status: SHIPPED | OUTPATIENT
Start: 2020-12-28 | End: 2022-03-20

## 2020-12-28 RX ORDER — ATORVASTATIN CALCIUM 20 MG/1
TABLET, FILM COATED ORAL
Qty: 90 TABLET | Refills: 2 | Status: SHIPPED | OUTPATIENT
Start: 2020-12-28 | End: 2020-12-28 | Stop reason: SDUPTHER

## 2021-01-20 RX ORDER — LISINOPRIL 10 MG/1
TABLET ORAL
Qty: 90 TABLET | Refills: 2 | Status: SHIPPED | OUTPATIENT
Start: 2021-01-20 | End: 2021-10-19

## 2021-01-20 RX ORDER — FLUOXETINE HYDROCHLORIDE 20 MG/1
CAPSULE ORAL
Qty: 90 CAPSULE | Refills: 2 | Status: SHIPPED | OUTPATIENT
Start: 2021-01-20 | End: 2021-10-19

## 2021-01-24 RX ORDER — FAMOTIDINE 40 MG/1
TABLET, FILM COATED ORAL
Qty: 60 TABLET | Refills: 11 | Status: SHIPPED | OUTPATIENT
Start: 2021-01-24 | End: 2022-01-19

## 2021-03-17 ENCOUNTER — OFFICE VISIT (OUTPATIENT)
Dept: INTERNAL MEDICINE CLINIC | Age: 76
End: 2021-03-17
Payer: MEDICARE

## 2021-03-17 VITALS
HEART RATE: 56 BPM | DIASTOLIC BLOOD PRESSURE: 62 MMHG | BODY MASS INDEX: 26.78 KG/M2 | SYSTOLIC BLOOD PRESSURE: 124 MMHG | WEIGHT: 156 LBS

## 2021-03-17 DIAGNOSIS — I10 ESSENTIAL HYPERTENSION: ICD-10-CM

## 2021-03-17 DIAGNOSIS — E78.00 PURE HYPERCHOLESTEROLEMIA: ICD-10-CM

## 2021-03-17 DIAGNOSIS — M20.11 HALLUX VALGUS OF RIGHT FOOT: Primary | ICD-10-CM

## 2021-03-17 PROCEDURE — 3017F COLORECTAL CA SCREEN DOC REV: CPT | Performed by: INTERNAL MEDICINE

## 2021-03-17 PROCEDURE — 99214 OFFICE O/P EST MOD 30 MIN: CPT | Performed by: INTERNAL MEDICINE

## 2021-03-17 PROCEDURE — 4040F PNEUMOC VAC/ADMIN/RCVD: CPT | Performed by: INTERNAL MEDICINE

## 2021-03-17 PROCEDURE — 1036F TOBACCO NON-USER: CPT | Performed by: INTERNAL MEDICINE

## 2021-03-17 PROCEDURE — G8484 FLU IMMUNIZE NO ADMIN: HCPCS | Performed by: INTERNAL MEDICINE

## 2021-03-17 PROCEDURE — G8427 DOCREV CUR MEDS BY ELIG CLIN: HCPCS | Performed by: INTERNAL MEDICINE

## 2021-03-17 PROCEDURE — 1090F PRES/ABSN URINE INCON ASSESS: CPT | Performed by: INTERNAL MEDICINE

## 2021-03-17 PROCEDURE — G8417 CALC BMI ABV UP PARAM F/U: HCPCS | Performed by: INTERNAL MEDICINE

## 2021-03-17 PROCEDURE — 1123F ACP DISCUSS/DSCN MKR DOCD: CPT | Performed by: INTERNAL MEDICINE

## 2021-03-17 PROCEDURE — G8400 PT W/DXA NO RESULTS DOC: HCPCS | Performed by: INTERNAL MEDICINE

## 2021-03-17 ASSESSMENT — PATIENT HEALTH QUESTIONNAIRE - PHQ9
2. FEELING DOWN, DEPRESSED OR HOPELESS: 0
SUM OF ALL RESPONSES TO PHQ QUESTIONS 1-9: 0

## 2021-03-17 NOTE — PROGRESS NOTES
[facility]- Internal Medicine  Preoperative Consultation      3/17/2021    Tonny Birmingham  :  1945    Chief Complaint   Patient presents with    Pre-op Exam      Right foot surgery , [facility], Dr. Diego Mensah         HPI:  This patient presents to the office today for a preoperative consultation at the request of Dr. Sury Anderson to assess stability of patient's medical condition(s) listed below. She will be having right foot surgery 3/26/2021. Bilateral hallux valgus, dull pain at baseline, worse with activity.      Review of Systems  Known anesthesia problems: None   Bleeding risk: No recent or remote history of abnormal bleeding  Personal or FH of DVT/PE: No    Recent steroid use: no  Exercise tolerance: excellent    Past Medical History:   Diagnosis Date    Depression     Hypertension     Murmur 10/30/2013    ECHO 10/13 Trivial AI, grade 1 diastolic dysfunction     Post-menopausal     Pure hypercholesterolemia 2012    The 10-year ASCVD risk score (Devika Sanabria, et al., 2013) is: 9.6%   Values used to calculate the score:     Age: 78 years     Sex: Female     Is an : No     Diabetic: No     Tobacco smoker: No     Systolic Blood Pressure: 945 mmHg     Prescribed Antihypertensives: Yes     HDL Cholesterol: 74 mg/dL     Total Cholesterol: 293 mg/dL      S/P total knee arthroplasty,  left -2017     Dr. Reuben Hernandez, cortisone injection ,      Status post left knee replacement      Past Surgical History:   Procedure Laterality Date    BLEPHAROPLASTY      BREAST REDUCTION SURGERY      MANDIBLE RECONSTRUCTION  90's    TONSILLECTOMY      TOTAL KNEE ARTHROPLASTY Left 2017    Dr. Reuben Hernandez     Family History   Problem Relation Age of Onset    Cancer Mother         lung, smoker    Cancer Father         esophageal, smoker     Social History     Tobacco Use    Smoking status: Never Smoker    Smokeless tobacco: Never Used   Substance Use Topics  Alcohol use: Yes     Comment: occ    Drug use: No     Allergies   Allergen Reactions    Amoxicillin Hives       Prior to Visit Medications    Medication Sig Taking? Authorizing Provider   MAGNESIUM PO Take by mouth daily Yes Historical Provider, MD   famotidine (PEPCID) 40 MG tablet TAKE ONE TABLET BY MOUTH TWICE A DAY Yes Anita Cárdenas MD   FLUoxetine (PROZAC) 20 MG capsule TAKE ONE CAPSULE BY MOUTH DAILY Yes Anita Cárdenas MD   lisinopril (PRINIVIL;ZESTRIL) 10 MG tablet TAKE ONE TABLET BY MOUTH DAILY Yes Anita Cárdenas MD   atorvastatin (LIPITOR) 20 MG tablet TAKE ONE TABLET BY MOUTH DAILY Yes Anita Cárdenas MD   clobetasol (TEMOVATE) 0.05 % external solution Apply to scalp daily Yes Anita Cárdenas MD   fluocinolone (SYNALAR) 0.025 % cream Apply topically 2 times daily. Yes Anita Cárdenas MD   metroNIDAZOLE (METROCREAM) 0.75 % cream APPLY  CREAM TOPICALLY TWICE DAILY Yes Anita Cárdenas MD   TURMERIC PO Take by mouth daily Yes Historical Provider, MD   fluticasone (FLONASE) 50 MCG/ACT nasal spray 2 sprays by Nasal route daily  Patient not taking: Reported on 3/17/2021  Anita Cárdenas MD   Calcium Carbonate-Vitamin D (CALCIUM + D PO) Take  by mouth. Historical Provider, MD       Physical Exam:  /62 (Site: Right Upper Arm, Position: Sitting, Cuff Size: Medium Adult)   Pulse 56   Wt 156 lb (70.8 kg)   BMI 26.78 kg/m²   Physical Exam  Constitutional:       General: She is not in acute distress. HENT:      Head: Normocephalic and atraumatic. Mouth/Throat:      Mouth: Mucous membranes are moist.      Pharynx: Oropharynx is clear. Eyes:      Pupils: Pupils are equal, round, and reactive to light. Neck:      Thyroid: No thyroid mass or thyromegaly. Vascular: No carotid bruit. Cardiovascular:      Rate and Rhythm: Normal rate and regular rhythm. Heart sounds: No murmur. Pulmonary:      Effort: Pulmonary effort is normal.      Breath sounds: Normal breath sounds.    Abdominal:      General: Bowel sounds are normal.      Palpations: Abdomen is soft. Tenderness: There is no abdominal tenderness. Musculoskeletal:      Right lower leg: No edema. Left lower leg: No edema. Neurological:      Mental Status: She is alert. EKG Interpretation: Clinically not indicated, patient reports not required for surgery. Lab Review: Pending       ASSESSMENT/PLAN:   1. Hallux valgus of right foot  Comments:  Medically stable with no contraindication to surgery. 2. Essential hypertension  Assessment & Plan:  Good control. Hold lisinopril on morning of surgery. Last potassium level was 4.9. Advised to stop her over-the-counter potassium supplement. Checking electrolytes and renal function today. 3. Pure hypercholesterolemia  Assessment & Plan:  Stable, hold atorvastatin morning of surgery. Lipid profile today    Pt advised to avoid NSAID's, OTC vitamin supplements and fish oil 1 week prior to procedure.

## 2021-10-19 RX ORDER — FLUOXETINE HYDROCHLORIDE 20 MG/1
CAPSULE ORAL
Qty: 90 CAPSULE | Refills: 0 | Status: SHIPPED | OUTPATIENT
Start: 2021-10-19 | End: 2022-02-08 | Stop reason: SDUPTHER

## 2021-10-19 RX ORDER — LISINOPRIL 10 MG/1
TABLET ORAL
Qty: 90 TABLET | Refills: 0 | Status: SHIPPED | OUTPATIENT
Start: 2021-10-19 | End: 2021-11-01

## 2021-10-19 NOTE — TELEPHONE ENCOUNTER
Last appointment: 3/17/2021  Next appointment: Visit date not found  Last refill: 1/20/2021  no return date given at the last office visit     Swetha Lockett

## 2021-10-31 ENCOUNTER — PATIENT MESSAGE (OUTPATIENT)
Dept: INTERNAL MEDICINE CLINIC | Age: 76
End: 2021-10-31

## 2021-11-01 RX ORDER — LISINOPRIL 10 MG/1
TABLET ORAL
Qty: 90 TABLET | Refills: 0 | Status: SHIPPED | OUTPATIENT
Start: 2021-11-01 | End: 2022-01-29

## 2021-11-01 RX ORDER — FLUOCINOLONE ACETONIDE 0.25 MG/G
CREAM TOPICAL
Qty: 15 G | Refills: 0 | Status: SHIPPED | OUTPATIENT
Start: 2021-11-01 | End: 2022-02-25

## 2021-11-01 NOTE — TELEPHONE ENCOUNTER
Last appointment: 3/17/2021  Next appointment: Visit date not found  Last refill: 10/19/21 - pharmacy did not receive this request   no return date given at last OV

## 2021-11-01 NOTE — TELEPHONE ENCOUNTER
From: Obey Prescott  To: Delfino Moreland MD  Sent: 10/31/2021 4:12 PM EDT  Subject: Prescription Question    I need the following prescriptions refilled:    Lisinopril 10MG    Metronidazole . 75%    Fluocinolone Acetonide: I NEED A SMALLER QUANTITY. [address] [place] [address]  IT EXPIRES BEFORE I USE 70% OF IT!!!

## 2022-01-19 RX ORDER — FAMOTIDINE 40 MG/1
TABLET, FILM COATED ORAL
Qty: 180 TABLET | Refills: 3 | Status: SHIPPED | OUTPATIENT
Start: 2022-01-19

## 2022-01-19 NOTE — TELEPHONE ENCOUNTER
Last appointment: 3/17/2021  Next appointment: Visit date not found  Last refill: 60 with 11 01/24/2021

## 2022-01-29 RX ORDER — LISINOPRIL 10 MG/1
TABLET ORAL
Qty: 90 TABLET | Refills: 0 | Status: SHIPPED | OUTPATIENT
Start: 2022-01-29 | End: 2022-05-02 | Stop reason: SDUPTHER

## 2022-01-29 NOTE — TELEPHONE ENCOUNTER
Last appointment: 3/17/2021  Next appointment: Visit date not found  Last refill: 90 with 0 11/01/2021  Sent [facility] message to schedule due/overdue appointment.

## 2022-02-08 ENCOUNTER — TELEMEDICINE (OUTPATIENT)
Dept: INTERNAL MEDICINE CLINIC | Age: 77
End: 2022-02-08
Payer: MEDICARE

## 2022-02-08 DIAGNOSIS — Z00.00 MEDICARE ANNUAL WELLNESS VISIT, SUBSEQUENT: Primary | ICD-10-CM

## 2022-02-08 DIAGNOSIS — Z00.00 ROUTINE GENERAL MEDICAL EXAMINATION AT A HEALTH CARE FACILITY: ICD-10-CM

## 2022-02-08 DIAGNOSIS — Z12.11 COLON CANCER SCREENING: ICD-10-CM

## 2022-02-08 PROCEDURE — 1123F ACP DISCUSS/DSCN MKR DOCD: CPT | Performed by: INTERNAL MEDICINE

## 2022-02-08 PROCEDURE — 4040F PNEUMOC VAC/ADMIN/RCVD: CPT | Performed by: INTERNAL MEDICINE

## 2022-02-08 PROCEDURE — G0439 PPPS, SUBSEQ VISIT: HCPCS | Performed by: INTERNAL MEDICINE

## 2022-02-08 PROCEDURE — G8484 FLU IMMUNIZE NO ADMIN: HCPCS | Performed by: INTERNAL MEDICINE

## 2022-02-08 PROCEDURE — 3017F COLORECTAL CA SCREEN DOC REV: CPT | Performed by: INTERNAL MEDICINE

## 2022-02-08 RX ORDER — FLUOXETINE HYDROCHLORIDE 20 MG/1
CAPSULE ORAL
Qty: 90 CAPSULE | Refills: 3 | Status: SHIPPED | OUTPATIENT
Start: 2022-02-08 | End: 2022-08-02 | Stop reason: SDUPTHER

## 2022-02-08 SDOH — ECONOMIC STABILITY: FOOD INSECURITY: WITHIN THE PAST 12 MONTHS, THE FOOD YOU BOUGHT JUST DIDN'T LAST AND YOU DIDN'T HAVE MONEY TO GET MORE.: NEVER TRUE

## 2022-02-08 SDOH — ECONOMIC STABILITY: FOOD INSECURITY: WITHIN THE PAST 12 MONTHS, YOU WORRIED THAT YOUR FOOD WOULD RUN OUT BEFORE YOU GOT MONEY TO BUY MORE.: NEVER TRUE

## 2022-02-08 ASSESSMENT — PATIENT HEALTH QUESTIONNAIRE - PHQ9
SUM OF ALL RESPONSES TO PHQ QUESTIONS 1-9: 0
1. LITTLE INTEREST OR PLEASURE IN DOING THINGS: 0
SUM OF ALL RESPONSES TO PHQ QUESTIONS 1-9: 0
SUM OF ALL RESPONSES TO PHQ9 QUESTIONS 1 & 2: 0
2. FEELING DOWN, DEPRESSED OR HOPELESS: 0

## 2022-02-08 ASSESSMENT — LIFESTYLE VARIABLES
HOW MANY STANDARD DRINKS CONTAINING ALCOHOL DO YOU HAVE ON A TYPICAL DAY: 0
AUDIT-C TOTAL SCORE: 2
HOW OFTEN DO YOU HAVE A DRINK CONTAINING ALCOHOL: 2
HOW OFTEN DO YOU HAVE SIX OR MORE DRINKS ON ONE OCCASION: 0

## 2022-02-08 ASSESSMENT — SOCIAL DETERMINANTS OF HEALTH (SDOH): HOW HARD IS IT FOR YOU TO PAY FOR THE VERY BASICS LIKE FOOD, HOUSING, MEDICAL CARE, AND HEATING?: NOT HARD AT ALL

## 2022-02-09 NOTE — PROGRESS NOTES
Medicare Annual Wellness Visit  Name: Brandi Weiss Date: 2022   MRN: <[id number]> Sex: Female   Age: 78 y.o. Ethnicity: Non- / Non    : 1945 Race: White (non-)      Isma Flanagan is here for Medicare AWV (colonoscopy due )    Screenings for behavioral, psychosocial and functional/safety risks, and cognitive dysfunction are all negative except as indicated below. These results, as well as other patient data from the [address] form, are documented in Flowsheets linked to this Encounter. Allergies   Allergen Reactions    Amoxicillin Hives         Prior to Visit Medications    Medication Sig Taking? Authorizing Provider   FLUoxetine (PROZAC) 20 MG capsule TAKE ONE CAPSULE BY MOUTH DAILY Yes Neptali Devries MD   lisinopril (PRINIVIL;ZESTRIL) 10 MG tablet TAKE ONE TABLET BY MOUTH DAILY Yes Neptali Devries MD   famotidine (PEPCID) 40 MG tablet TAKE ONE TABLET BY MOUTH TWICE A DAY Yes Neptali Devries MD   metroNIDAZOLE (METROCREAM) 0.75 % cream APPLY  CREAM TOPICALLY TWICE DAILY Yes Neptali Devries MD   fluocinolone (SYNALAR) 0.025 % cream Apply topically 2 times daily. Yes Neptali Devries MD   MAGNESIUM PO Take by mouth daily Yes Historical Provider, MD   atorvastatin (LIPITOR) 20 MG tablet TAKE ONE TABLET BY MOUTH DAILY Yes Neptali Devries MD   TURMERIC PO Take by mouth daily Yes Historical Provider, MD   Calcium Carbonate-Vitamin D (CALCIUM + D PO) Take  by mouth.    Yes Historical Provider, MD   clobetasol (TEMOVATE) 0.05 % external solution Apply to scalp daily  Patient not taking: Reported on 2022  Neptali Devries MD   fluticasone [facility]) 50 MCG/ACT nasal spray 2 sprays by Nasal route daily  Patient not taking: Reported on 3/17/2021  Neptali Devries MD         Past Medical History:   Diagnosis Date    Depression     Hypertension     Murmur 10/30/2013    ECHO 10/13 Trivial AI, grade 1 diastolic dysfunction     Post-menopausal     Pure hypercholesterolemia 2012    The 10-year ASCVD risk score (Eriberto Rodriguez, et al., 2013) is: 9.6%   Values used to calculate the score:     Age: 78 years     Sex: Female     Is an : No     Diabetic: No     Tobacco smoker: No     Systolic Blood Pressure: 450 mmHg     Prescribed Antihypertensives: Yes     HDL Cholesterol: 74 mg/dL     Total Cholesterol: 293 mg/dL      S/P total knee arthroplasty,  left -1/2017     Dr. Marvel Newell, cortisone injection 2/13, 5/14     Status post left knee replacement 2017       Past Surgical History:   Procedure Laterality Date    BLEPHAROPLASTY  05/2005    BREAST REDUCTION SURGERY  12/2011    FOOT SURGERY Right 03/2021    Dr. Aleta Rodriguez, fusion, osteotomy, 1st MTP, hammertoe repair, 2nd toe    MANDIBLE RECONSTRUCTION  80's    TONSILLECTOMY      TOTAL KNEE ARTHROPLASTY Left 01/2017    Dr. Marvel Newell         Family History   Problem Relation Age of Onset    Cancer Mother         lung, smoker    Cancer Father         esophageal, smoker       CareTeam (Including outside providers/suppliers regularly involved in providing care):   Patient Care Team:  Radha Garcia MD as PCP - General (Internal Medicine)  Radha Garcia MD as PCP - [place] [facility] [facility]  Merary Lagunas MD (Obstetrics & Gynecology)  Kylie Valentine MD as Surgeon (Orthopedic Surgery)  Bharathi Richards MD as Consulting Physician (Orthopedic Surgery)    Wt Readings from Last 3 Encounters:   03/17/21 156 lb (70.8 kg)   04/28/20 149 lb (67.6 kg)   04/02/19 153 lb (69.4 kg)     Patient-Reported Vitals 11/16/2020   Patient-Reported Systolic 328   Patient-Reported Diastolic 77    Patient unable to provide vitals today. Based upon direct observation of the patient, evaluation of cognition reveals recent and remote memory intact. Patient's complete Health Risk Assessment and screening values have been reviewed and are found in Flowsheets.  The following problems were reviewed today and where indicated follow up appointments were made and/or referrals ordered.     Positive Risk Factor Screenings with Interventions:            General Health and ACP:    Advance Directives     Power of  Living Will ACP-Advance Directive ACP-Power of     Not on File Not on File Not on File Not on File      General Health Risk Interventions:  · No Living Will: ACP documents already completed- patient asked to provide copy to the office          Personalized Preventive Plan   Current Health Maintenance Status  Immunization History   Administered Date(s) Administered    COVID-19, Pfizer Purple top, DILUTE for use, 12+ yrs, 30mcg/0.3mL dose 02/04/2021, 02/25/2021, 09/30/2021    Hepatitis A 05/08/2006    Influenza 11/15/2012    Influenza Virus Vaccine 11/01/2020, 11/15/2021    Influenza, High Dose (Fluzone 65 yrs and older) 10/04/2013, 10/10/2014, 11/06/2015, 09/26/2018    Influenza, Quadv, IM, (6 mo and older Fluzone, Flulaval, Fluarix and 3 yrs and older Afluria) 11/02/2016, 12/01/2017    Pneumococcal Conjugate 13-valent ([id number]) 08/11/2017    Pneumococcal Polysaccharide ([id number]) 10/10/2014    Tdap (Boostrix, Adacel) 07/17/2010    Zoster Recombinant (Shingrix) 03/05/2019, 06/11/2019        Health Maintenance   Topic Date Due    Colon cancer screen colonoscopy  11/02/2021    Annual Wellness Visit (AWV)  11/12/2021    Lipid screen  03/17/2022    Potassium monitoring  03/17/2022    Creatinine monitoring  03/17/2022    Depression Screen  02/08/2023    DTaP/Tdap/Td vaccine (3 - Td or Tdap) 09/19/2023    Breast cancer screen  11/12/2023    DEXA (modify frequency per FRAX score)  Completed    Flu vaccine  Completed    Shingles Vaccine  Completed    Pneumococcal 65+ years Vaccine  Completed    COVID-19 Vaccine  Completed    Hepatitis C screen  Completed    Hepatitis A vaccine  Aged Out    Hepatitis B vaccine  Aged Out    Hib vaccine  Aged Out    Meningococcal (ACWY) vaccine  Aged [employer] annual wellness visit, subsequent    Recommendations for Preventive Services Due: see orders and patient instructions/AVS.  . Recommended screening schedule for the next 5-10 years is provided to the patient in written form: see Patient Instructions/AVS.    Patient will provide copy of living will and medical power or  to office. Have already been completed. Colon cancer screening  -     [facility] - Cristo Cardenas MD, Gastroenterology, [place]      Return in about 3 months (around 5/8/2022) for HTN. Micha Daley is a 78 y.o. female being evaluated by a Virtual Visit (video and audio) encounter to address concerns as mentioned above. A caregiver was present when appropriate. Due to this being a TeleHealth encounter ([facility] emergency), evaluation of the following organ systems was limited: Vitals/Constitutional/EENT/Resp/CV/GI//MS/Neuro/Skin/Heme-Lymph-Imm. Pursuant to the emergency declaration under the [address] [place] [address] and the [employer] Act, this Virtual Visit was conducted with patient's (and/or legal guardian's) consent, to reduce the patient's risk of exposure to COVID-19 and provide necessary medical care. The patient (and/or legal guardian) has also been advised to contact this office for worsening conditions or problems, and seek emergency medical treatment and/or call 911 if deemed necessary. Patient identification was verified at the start of the visit: Yes    Services were provided through a video synchronous discussion virtually to substitute for in-person clinic visit. Patient and provider were located at their individual homes. --Faviola Schultz MD on 2/8/2022 at 10:00 PM    An electronic signature was used to authenticate this note.

## 2022-02-09 NOTE — PATIENT INSTRUCTIONS
Personalized Preventive Plan for Jennifer Roach - 2/8/2022  Medicare offers a range of preventive health benefits. Some of the tests and screenings are paid in full while other may be subject to a deductible, co-insurance, and/or copay. Some of these benefits include a comprehensive review of your medical history including lifestyle, illnesses that may run in your family, and various assessments and screenings as appropriate. After reviewing your medical record and screening and assessments performed today your provider may have ordered immunizations, labs, imaging, and/or referrals for you. A list of these orders (if applicable) as well as your Preventive Care list are included within your After Visit Summary for your review. Other Preventive Recommendations:    · A preventive eye exam performed by an eye specialist is recommended every 1-2 years to screen for glaucoma; cataracts, macular degeneration, and other eye disorders. · A preventive dental visit is recommended every 6 months. · Try to get at least 150 minutes of exercise per week or 10,000 steps per day on a pedometer . · Order or download the FREE \"Exercise & Physical Activity: Your Everyday Guide\" from [employer]. Call 6-418.597.7212 or search [employer] online. · You need 8916-8965 mg of calcium and 1756-8346 IU of vitamin D per day. It is possible to meet your calcium requirement with diet alone, but a vitamin D supplement is usually necessary to meet this goal.  · When exposed to the sun, use a sunscreen that protects against both UVA and UVB radiation with an SPF of 30 or greater. Reapply every 2 to 3 hours or after sweating, drying off with a towel, or swimming. · Always wear a seat belt when traveling in a car. Always wear a helmet when riding a bicycle or motorcycle.

## 2022-02-25 ENCOUNTER — OFFICE VISIT (OUTPATIENT)
Dept: INTERNAL MEDICINE CLINIC | Age: 77
End: 2022-02-25
Payer: MEDICARE

## 2022-02-25 VITALS
RESPIRATION RATE: 16 BRPM | OXYGEN SATURATION: 98 % | DIASTOLIC BLOOD PRESSURE: 80 MMHG | BODY MASS INDEX: 26.98 KG/M2 | HEIGHT: 64 IN | HEART RATE: 54 BPM | SYSTOLIC BLOOD PRESSURE: 132 MMHG | WEIGHT: 158 LBS

## 2022-02-25 DIAGNOSIS — H10.9 CONJUNCTIVITIS OF RIGHT EYE, UNSPECIFIED CONJUNCTIVITIS TYPE: Primary | ICD-10-CM

## 2022-02-25 DIAGNOSIS — D72.819 LEUKOPENIA, UNSPECIFIED TYPE: ICD-10-CM

## 2022-02-25 DIAGNOSIS — E78.00 PURE HYPERCHOLESTEROLEMIA: ICD-10-CM

## 2022-02-25 DIAGNOSIS — I10 ESSENTIAL HYPERTENSION: ICD-10-CM

## 2022-02-25 PROCEDURE — 1123F ACP DISCUSS/DSCN MKR DOCD: CPT | Performed by: INTERNAL MEDICINE

## 2022-02-25 PROCEDURE — G9899 SCRN MAM PERF RSLTS DOC: HCPCS | Performed by: INTERNAL MEDICINE

## 2022-02-25 PROCEDURE — 4040F PNEUMOC VAC/ADMIN/RCVD: CPT | Performed by: INTERNAL MEDICINE

## 2022-02-25 PROCEDURE — G8400 PT W/DXA NO RESULTS DOC: HCPCS | Performed by: INTERNAL MEDICINE

## 2022-02-25 PROCEDURE — 1090F PRES/ABSN URINE INCON ASSESS: CPT | Performed by: INTERNAL MEDICINE

## 2022-02-25 PROCEDURE — 99214 OFFICE O/P EST MOD 30 MIN: CPT | Performed by: INTERNAL MEDICINE

## 2022-02-25 PROCEDURE — G8417 CALC BMI ABV UP PARAM F/U: HCPCS | Performed by: INTERNAL MEDICINE

## 2022-02-25 PROCEDURE — 1036F TOBACCO NON-USER: CPT | Performed by: INTERNAL MEDICINE

## 2022-02-25 PROCEDURE — 3017F COLORECTAL CA SCREEN DOC REV: CPT | Performed by: INTERNAL MEDICINE

## 2022-02-25 PROCEDURE — G8484 FLU IMMUNIZE NO ADMIN: HCPCS | Performed by: INTERNAL MEDICINE

## 2022-02-25 PROCEDURE — G8427 DOCREV CUR MEDS BY ELIG CLIN: HCPCS | Performed by: INTERNAL MEDICINE

## 2022-02-25 RX ORDER — TOBRAMYCIN AND DEXAMETHASONE 3; 1 MG/ML; MG/ML
1 SUSPENSION/ DROPS OPHTHALMIC
Qty: 2.5 ML | Refills: 0 | Status: SHIPPED | OUTPATIENT
Start: 2022-02-25 | End: 2022-02-26 | Stop reason: SDUPTHER

## 2022-02-25 NOTE — PROGRESS NOTES
Martha Desir   :  1945    ASSESSMENT/PLAN:   1. Conjunctivitis of right eye, unspecified conjunctivitis type   Tobradex 1 gtt q 4 while awake for no more than 5 days. Advised medication to be used only for 5 days. If not improving, needs to see her eye doctor. 2. Essential hypertension  Assessment & Plan:   Well-controlled, continue lisinopril 10 mg.  3. Pure hypercholesterolemia  Assessment & Plan:   Well-controlled, continue current lipitor 20 mg. Due for labs, but office lab closed for the day alrady. To stop in one of [email or web address] labs. Return in about 1 year (around 2023) for AWV/chronic. SUBJECTIVE:  78 y.o. female established patient here for:   Chief Complaint   Patient presents with    Eye Problem     Right eye x 3 days     3-4 days right eye irritation. Itching medially, burning and weeping of the eye. Some matting in am, but no purulence noted. No photosensitivity and no pain. Left eye unaffected. Denies foreign body sensation. Has been routinely using saline drops to help with the symptoms.      Review of Systems    Outpatient Medications Marked as Taking for the 22 encounter (Office Visit) with Bernardo Vides MD   Medication Sig Dispense Refill    FLUoxetine (PROZAC) 20 MG capsule TAKE ONE CAPSULE BY MOUTH DAILY 90 capsule 3    lisinopril (PRINIVIL;ZESTRIL) 10 MG tablet TAKE ONE TABLET BY MOUTH DAILY 90 tablet 0    famotidine (PEPCID) 40 MG tablet TAKE ONE TABLET BY MOUTH TWICE A  tablet 3    metroNIDAZOLE (METROCREAM) 0.75 % cream APPLY  CREAM TOPICALLY TWICE DAILY 45 g 0    MAGNESIUM PO Take by mouth daily      atorvastatin (LIPITOR) 20 MG tablet TAKE ONE TABLET BY MOUTH DAILY 90 tablet 2    clobetasol (TEMOVATE) 0.05 % external solution Apply to scalp daily 1 Bottle 1    TURMERIC PO Take by mouth daily      fluticasone (FLONASE) 50 MCG/ACT nasal spray 2 sprays by Nasal route daily 1 Bottle 2    Calcium Carbonate-Vitamin D (CALCIUM + D PO) Take  by mouth. OBJECTIVE:  Vitals:    02/25/22 1559   BP: 132/80   Pulse: 54   Resp: 16   SpO2: 98%   Weight: 158 lb (71.7 kg)   Height: 5' 4\" (1.626 m)     Physical Exam  Constitutional:       Appearance: Normal appearance. Eyes:      General: Lids are normal.         Right eye: No discharge or hordeolum. Left eye: No discharge or hordeolum. Extraocular Movements: Extraocular movements intact. Conjunctiva/sclera:      Right eye: Right conjunctiva is injected. No chemosis, exudate or hemorrhage. Left eye: Left conjunctiva is not injected. No chemosis, exudate or hemorrhage. Cardiovascular:      Rate and Rhythm: Normal rate and regular rhythm. Pulmonary:      Effort: Pulmonary effort is normal.      Breath sounds: Normal breath sounds. Musculoskeletal:      Right lower leg: No edema. Left lower leg: No edema. This note was generated completely or in part utilizing Dragon dictation speech recognition software. Occasionally, words are mistranscribed and despite editing, the text may contain inaccuracies due to incorrect word recognition.   If further clarification is needed please contact the office at [phone]  --Cristina Cannon MD

## 2022-02-26 DIAGNOSIS — H10.9 CONJUNCTIVITIS OF RIGHT EYE, UNSPECIFIED CONJUNCTIVITIS TYPE: ICD-10-CM

## 2022-02-26 RX ORDER — TOBRAMYCIN AND DEXAMETHASONE 3; 1 MG/ML; MG/ML
1 SUSPENSION/ DROPS OPHTHALMIC
Qty: 5 ML | Refills: 0 | Status: SHIPPED | OUTPATIENT
Start: 2022-02-26 | End: 2022-03-03

## 2022-02-27 NOTE — ASSESSMENT & PLAN NOTE
Well-controlled, continue current lipitor 20 mg. Due for labs, but office lab closed for the day already. To stop in one of [employer] labs.

## 2022-03-20 RX ORDER — ATORVASTATIN CALCIUM 20 MG/1
TABLET, FILM COATED ORAL
Qty: 90 TABLET | Refills: 2 | Status: SHIPPED | OUTPATIENT
Start: 2022-03-20

## 2022-04-14 ENCOUNTER — PATIENT MESSAGE (OUTPATIENT)
Dept: INTERNAL MEDICINE CLINIC | Age: 77
End: 2022-04-14

## 2022-04-14 NOTE — TELEPHONE ENCOUNTER
Last appointment: 2/25/2022  Next appointment: Visit date not found  Last refill: 11/1/21      Please advise as DOD  Thank you

## 2022-04-14 NOTE — TELEPHONE ENCOUNTER
From: Hardy Ratliff  To: Dr. Cristian Grayson  Sent: 4/14/2022 12:29 PM EDT  Subject: Prescription Needed    Jay Moore:  I need a refill on the Metronidazole cream .75%. Estrella Lerma 67 to [place] to buy a house. 77th birthday celebration, [facility] Whoop!!!   Maureen Roberto

## 2022-04-15 ENCOUNTER — TELEPHONE (OUTPATIENT)
Dept: ADMINISTRATIVE | Age: 77
End: 2022-04-15

## 2022-04-15 NOTE — TELEPHONE ENCOUNTER
Submitted PA for metroNIDAZOLE 0.75% cream  Via CMM  Key: [id number] STATUS: APPROVED. Medication has been approved through 12/31/2022. Letter is attached. If this requires a response please respond to the pool. [address])    Please advise patient. Thank you.

## 2022-05-02 RX ORDER — LISINOPRIL 10 MG/1
TABLET ORAL
Qty: 90 TABLET | Refills: 0 | Status: SHIPPED | OUTPATIENT
Start: 2022-05-02 | End: 2022-08-01

## 2022-07-20 ENCOUNTER — OFFICE VISIT (OUTPATIENT)
Dept: INTERNAL MEDICINE CLINIC | Age: 77
End: 2022-07-20
Payer: MEDICARE

## 2022-07-20 VITALS
WEIGHT: 157.4 LBS | SYSTOLIC BLOOD PRESSURE: 138 MMHG | OXYGEN SATURATION: 94 % | HEART RATE: 55 BPM | DIASTOLIC BLOOD PRESSURE: 60 MMHG | HEIGHT: 64 IN | BODY MASS INDEX: 26.87 KG/M2

## 2022-07-20 DIAGNOSIS — Z01.818 PRE-OP EVALUATION: ICD-10-CM

## 2022-07-20 DIAGNOSIS — I10 ESSENTIAL HYPERTENSION: ICD-10-CM

## 2022-07-20 DIAGNOSIS — M20.21 HALLUX RIGIDUS, RIGHT FOOT: Primary | ICD-10-CM

## 2022-07-20 DIAGNOSIS — T84.84XA PAINFUL ORTHOPAEDIC HARDWARE (HCC): ICD-10-CM

## 2022-07-20 PROCEDURE — 93000 ELECTROCARDIOGRAM COMPLETE: CPT | Performed by: INTERNAL MEDICINE

## 2022-07-20 PROCEDURE — 99214 OFFICE O/P EST MOD 30 MIN: CPT | Performed by: INTERNAL MEDICINE

## 2022-07-20 PROCEDURE — 1123F ACP DISCUSS/DSCN MKR DOCD: CPT | Performed by: INTERNAL MEDICINE

## 2022-07-20 ASSESSMENT — ENCOUNTER SYMPTOMS
EYES NEGATIVE: 1
RESPIRATORY NEGATIVE: 1
SHORTNESS OF BREATH: 0
COUGH: 0
GASTROINTESTINAL NEGATIVE: 1

## 2022-07-20 NOTE — PROGRESS NOTES
[facility]- Internal Medicine  Preoperative Consultation      2022    George Galan  :  1945    Chief Complaint   Patient presents with    Pre-op Exam     2022 - Bharath Ruiz MD @ [facility]   Right great toe removal of hardware . Fax [phone] and [phone]        HPI:  This patient presents to the office today for a preoperative consultation at the request of Dr. Nadia Arambula to assess stability of patient's medical condition(s) listed below. She will be having removal of hardware right foot, . Ongoing pain and limited activity related to hardware in her right foot. Review of Systems   Constitutional: Negative. Negative for chills and fever. HENT: Negative. Eyes: Negative. Respiratory: Negative. Negative for cough and shortness of breath. Cardiovascular: Negative. Negative for chest pain and palpitations. Gastrointestinal: Negative. Genitourinary: Negative. Skin: Negative. Negative for rash. Neurological: Negative. Hematological:  Does not bruise/bleed easily. Psychiatric/Behavioral: Negative.      Known anesthesia problems: None   Bleeding risk: No recent or remote history of abnormal bleeding  Personal or FH of DVT/PE: No    Recent steroid use: no  Exercise tolerance: 10 METS, swims for an hour    Past Medical History:   Diagnosis Date    Depression     Hypertension     Murmur 10/30/2013    ECHO 10/13 Trivial AI, grade 1 diastolic dysfunction     Pure hypercholesterolemia 2012    The 10-year ASCVD risk score (Anita Bocanegra, et al., 2013) is: 9.6%   Values used to calculate the score:     Age: 78 years     Sex: Female     Is an : No     Diabetic: No     Tobacco smoker: No     Systolic Blood Pressure: 069 mmHg     Prescribed Antihypertensives: Yes     HDL Cholesterol: 74 mg/dL     Total Cholesterol: 293 mg/dL      Status post left knee replacement     Dr. Jeniffer Huff     Past Surgical History: Procedure Laterality Date    BLEPHAROPLASTY  05/2005    BREAST REDUCTION SURGERY  12/2011    FOOT SURGERY Right 03/2021    Dr. Teena Valverde, fusion, osteotomy, 1st MTP, hammertoe repair, 2nd toe    MANDIBLE RECONSTRUCTION  90's    TONSILLECTOMY      TOTAL KNEE ARTHROPLASTY Left 01/2017    Dr. Portia Duenas     Family History   Problem Relation Age of Onset    Cancer Mother         lung, smoker    Cancer Father         esophageal, smoker     Social History     Tobacco Use    Smoking status: Never    Smokeless tobacco: Never   Substance Use Topics    Alcohol use: Yes     Comment: occ    Drug use: No     Allergies   Allergen Reactions    Amoxicillin Hives       Outpatient Medications Marked as Taking for the 7/20/22 encounter (Office Visit) with Dov Santamaria MD   Medication Sig Dispense Refill    lisinopril (PRINIVIL;ZESTRIL) 10 MG tablet TAKE ONE TABLET BY MOUTH DAILY 90 tablet 0    metroNIDAZOLE (METROCREAM) 0.75 % cream APPLY  CREAM TOPICALLY TWICE DAILY 45 g 0    atorvastatin (LIPITOR) 20 MG tablet TAKE ONE TABLET BY MOUTH DAILY 90 tablet 2    FLUoxetine (PROZAC) 20 MG capsule TAKE ONE CAPSULE BY MOUTH DAILY 90 capsule 3    famotidine (PEPCID) 40 MG tablet TAKE ONE TABLET BY MOUTH TWICE A  tablet 3    MAGNESIUM PO Take by mouth daily      clobetasol (TEMOVATE) 0.05 % external solution Apply to scalp daily 1 Bottle 1    TURMERIC PO Take by mouth daily      Calcium Carbonate-Vitamin D (CALCIUM + D PO) Take  by mouth. Physical Exam:  /60   Pulse 55   Ht 5' 4\" (1.626 m)   Wt 157 lb 6.4 oz (71.4 kg)   SpO2 94%   BMI 27.02 kg/m²   Physical Exam  Constitutional:       Appearance: Normal appearance. Eyes:      Pupils: Pupils are equal, round, and reactive to light. Neck:      Thyroid: No thyroid mass or thyromegaly. Vascular: No carotid bruit. Cardiovascular:      Rate and Rhythm: Normal rate and regular rhythm. Heart sounds: Murmur (1-2/6 murmur at RSB) heard.    Pulmonary:      Effort: Pulmonary effort is normal.      Breath sounds: Normal breath sounds. Abdominal:      General: Bowel sounds are normal.      Palpations: Abdomen is soft. Tenderness: There is no abdominal tenderness. Musculoskeletal:      Right lower leg: No edema. Left lower leg: No edema. Neurological:      General: No focal deficit present. Mental Status: She is alert and oriented to person, place, and time. EKG Interpretation:  sinus bradycardia, no ischemic changes, unchanged from previous tracings. Lab Review: pending, CMP, CBC drawn today     ASSESSMENT/PLAN:   Hallux rigidus, right foot  Painful orthopaedic hardware [facility])  Comments:  Scheduled for hardware removal of foot. Pre-op evaluation  Comments:  Medically stable, with no contraindication to planned surgery. Essential hypertension  Assessment & Plan:  Well-controlled. Continue lisinopril 10 mg. Can hold on morning of surgery. Pt advised to avoid NSAID's, OTC vitamin supplements and fish oil 1 week prior to procedure.       Earl Oneill MD

## 2022-08-01 RX ORDER — LISINOPRIL 10 MG/1
TABLET ORAL
Qty: 90 TABLET | Refills: 2 | Status: SHIPPED | OUTPATIENT
Start: 2022-08-01

## 2022-08-01 NOTE — TELEPHONE ENCOUNTER
Medication:   Requested Prescriptions     Pending Prescriptions Disp Refills    lisinopril (PRINIVIL;ZESTRIL) 10 MG tablet [Pharmacy Med Name: LISINOPRIL 10 MG TABLET] 90 tablet 0     Sig: TAKE ONE TABLET BY MOUTH DAILY     Last Filled:  5/2/22    Last appt: 7/20/2022   Next appt: Visit date not found

## 2022-08-02 RX ORDER — FLUOXETINE HYDROCHLORIDE 20 MG/1
CAPSULE ORAL
Qty: 90 CAPSULE | Refills: 2 | Status: SHIPPED | OUTPATIENT
Start: 2022-08-02

## 2022-08-22 RX ORDER — CARBOXYMETHYLCELLULOSE/CITRIC 0.75 G
CAPSULE ORAL
Qty: 180 CAPSULE | Refills: 0 | Status: SHIPPED | OUTPATIENT
Start: 2022-08-22

## 2022-08-22 NOTE — PROGRESS NOTES
Have discussed medication with patient. Would like to see how it works for her. Must take routine medications in am prior to starting the plenity.

## 2022-11-19 ENCOUNTER — PATIENT MESSAGE (OUTPATIENT)
Dept: INTERNAL MEDICINE CLINIC | Age: 77
End: 2022-11-19

## 2022-11-21 RX ORDER — FLUOCINOLONE ACETONIDE 0.25 MG/G
CREAM TOPICAL
Qty: 60 G | Refills: 0 | Status: SHIPPED | OUTPATIENT
Start: 2022-11-21

## 2022-11-21 RX ORDER — LISINOPRIL 10 MG/1
TABLET ORAL
Qty: 90 TABLET | Refills: 0 | Status: SHIPPED | OUTPATIENT
Start: 2022-11-21

## 2022-11-21 RX ORDER — ATORVASTATIN CALCIUM 20 MG/1
TABLET, FILM COATED ORAL
Qty: 90 TABLET | Refills: 0 | Status: SHIPPED | OUTPATIENT
Start: 2022-11-21

## 2022-11-21 RX ORDER — FLUOXETINE HYDROCHLORIDE 20 MG/1
CAPSULE ORAL
Qty: 90 CAPSULE | Refills: 0 | Status: SHIPPED | OUTPATIENT
Start: 2022-11-21

## 2022-11-21 RX ORDER — FAMOTIDINE 40 MG/1
TABLET, FILM COATED ORAL
Qty: 180 TABLET | Refills: 0 | Status: SHIPPED | OUTPATIENT
Start: 2022-11-21

## 2022-11-21 NOTE — TELEPHONE ENCOUNTER
Last appointment: 7/20/2022 (Pre-op)  Next appointment: Visit date not found  Last refill: Varying dates. Fluoccinolone has nott been prescribed since 2020  Sent [employer] message to schedule next appointment due in February.

## 2022-11-21 NOTE — TELEPHONE ENCOUNTER
From: Tu Sample  To: Dr. [place]  Sent: 11/19/2022 1:28 PM EST  [facility]  Could you have your office call our local [facility] ([phone]) to refill all my pills (4) and the 2 creams (Fluocinolone Acetonide . 025% & Metronidazole . 75%). Hope all is well with family. We are adjusting but it is taking time.    Florance Borders

## 2023-04-20 RX ORDER — FLUOCINOLONE ACETONIDE 0.25 MG/G
CREAM TOPICAL
Qty: 60 G | Refills: 0 | Status: SHIPPED | OUTPATIENT
Start: 2023-04-20

## 2023-04-20 NOTE — TELEPHONE ENCOUNTER
Last appointment: 7/20/2022  Next appointment: Visit date not found  Last refill: 11/21/22   Sent [employer] message to schedule due/overdue appointment.

## 2023-05-28 RX ORDER — FLUOCINOLONE ACETONIDE 0.25 MG/G
CREAM TOPICAL
Qty: 60 G | Refills: 0 | OUTPATIENT
Start: 2023-05-28

## 2023-07-19 RX ORDER — FAMOTIDINE 40 MG/1
TABLET, FILM COATED ORAL
Qty: 180 TABLET | Refills: 0 | OUTPATIENT
Start: 2023-07-19

## 2024-02-04 RX ORDER — FLUOCINOLONE ACETONIDE 0.25 MG/G
CREAM TOPICAL
Qty: 60 G | Refills: 0 | OUTPATIENT
Start: 2024-02-04

## 2025-04-19 NOTE — ASSESSMENT & PLAN NOTE
Good control. Hold lisinopril on morning of surgery. Last potassium level was 4.9. Advised to stop her over-the-counter potassium supplement. Checking electrolytes and renal function today.
Stable, hold atorvastatin morning of surgery.   Lipid profile today
The patient is a 43y Female complaining of abdominal pain.